# Patient Record
Sex: MALE | Race: BLACK OR AFRICAN AMERICAN | NOT HISPANIC OR LATINO | Employment: OTHER | URBAN - METROPOLITAN AREA
[De-identification: names, ages, dates, MRNs, and addresses within clinical notes are randomized per-mention and may not be internally consistent; named-entity substitution may affect disease eponyms.]

---

## 2017-12-08 RX ORDER — LISINOPRIL 20 MG/1
TABLET ORAL
Qty: 30 TABLET | Refills: 5 | OUTPATIENT
Start: 2017-12-08

## 2019-05-07 ENCOUNTER — OFFICE VISIT (OUTPATIENT)
Dept: NEUROLOGY | Facility: CLINIC | Age: 54
End: 2019-05-07
Payer: MEDICARE

## 2019-05-07 VITALS — HEART RATE: 83 BPM | SYSTOLIC BLOOD PRESSURE: 162 MMHG | DIASTOLIC BLOOD PRESSURE: 81 MMHG

## 2019-05-07 DIAGNOSIS — G47.33 OBSTRUCTIVE SLEEP APNEA: ICD-10-CM

## 2019-05-07 DIAGNOSIS — Z86.73 HISTORY OF EMBOLIC STROKE: ICD-10-CM

## 2019-05-07 DIAGNOSIS — G45.9 TRANSIENT CEREBRAL ISCHEMIA, UNSPECIFIED TYPE: ICD-10-CM

## 2019-05-07 DIAGNOSIS — R51.9 NEW ONSET OF HEADACHES: ICD-10-CM

## 2019-05-07 DIAGNOSIS — I10 ESSENTIAL HYPERTENSION: ICD-10-CM

## 2019-05-07 DIAGNOSIS — E13.49 OTHER SPECIFIED DIABETES MELLITUS WITH OTHER DIABETIC NEUROLOGICAL COMPLICATION: ICD-10-CM

## 2019-05-07 DIAGNOSIS — F17.200 SMOKER: ICD-10-CM

## 2019-05-07 DIAGNOSIS — I63.412 EMBOLIC STROKE INVOLVING LEFT MIDDLE CEREBRAL ARTERY: Primary | ICD-10-CM

## 2019-05-07 DIAGNOSIS — E11.49 TYPE 2 DIABETES MELLITUS WITH OTHER NEUROLOGIC COMPLICATION, WITHOUT LONG-TERM CURRENT USE OF INSULIN: ICD-10-CM

## 2019-05-07 PROCEDURE — 99214 PR OFFICE/OUTPT VISIT, EST, LEVL IV, 30-39 MIN: ICD-10-PCS | Mod: S$PBB,,, | Performed by: PSYCHIATRY & NEUROLOGY

## 2019-05-07 PROCEDURE — 99999 PR PBB SHADOW E&M-EST. PATIENT-LVL III: ICD-10-PCS | Mod: PBBFAC,,, | Performed by: PSYCHIATRY & NEUROLOGY

## 2019-05-07 PROCEDURE — 99213 OFFICE O/P EST LOW 20 MIN: CPT | Mod: PBBFAC | Performed by: PSYCHIATRY & NEUROLOGY

## 2019-05-07 PROCEDURE — 99999 PR PBB SHADOW E&M-EST. PATIENT-LVL III: CPT | Mod: PBBFAC,,, | Performed by: PSYCHIATRY & NEUROLOGY

## 2019-05-07 PROCEDURE — 99214 OFFICE O/P EST MOD 30 MIN: CPT | Mod: S$PBB,,, | Performed by: PSYCHIATRY & NEUROLOGY

## 2019-05-07 RX ORDER — AMLODIPINE BESYLATE 5 MG/1
TABLET ORAL
Refills: 6 | COMMUNITY
Start: 2019-04-10

## 2019-05-07 RX ORDER — LOSARTAN POTASSIUM AND HYDROCHLOROTHIAZIDE 12.5; 1 MG/1; MG/1
TABLET ORAL
Refills: 6 | COMMUNITY
Start: 2019-04-05 | End: 2023-08-07 | Stop reason: CLARIF

## 2019-05-07 RX ORDER — ATORVASTATIN CALCIUM 20 MG/1
TABLET, FILM COATED ORAL
Refills: 5 | COMMUNITY
Start: 2019-04-05

## 2019-05-07 RX ORDER — TELMISARTAN 80 MG/1
TABLET ORAL
Refills: 6 | COMMUNITY
Start: 2019-05-01 | End: 2023-08-07 | Stop reason: CLARIF

## 2019-05-07 RX ORDER — BUTALBITAL, ACETAMINOPHEN AND CAFFEINE 300; 40; 50 MG/1; MG/1; MG/1
CAPSULE ORAL
Refills: 0 | COMMUNITY
Start: 2019-05-01 | End: 2019-05-07

## 2019-05-07 RX ORDER — LINAGLIPTIN 5 MG/1
TABLET, FILM COATED ORAL
Refills: 6 | COMMUNITY
Start: 2019-04-23 | End: 2023-08-07 | Stop reason: CLARIF

## 2019-05-07 RX ORDER — METFORMIN HYDROCHLORIDE 750 MG/1
TABLET, EXTENDED RELEASE ORAL
Refills: 6 | COMMUNITY
Start: 2019-04-05 | End: 2023-08-07 | Stop reason: CLARIF

## 2019-05-07 RX ORDER — METOCLOPRAMIDE 10 MG/1
10 TABLET ORAL EVERY 6 HOURS PRN
Qty: 28 TABLET | Refills: 0 | Status: SHIPPED | OUTPATIENT
Start: 2019-05-07 | End: 2023-08-07 | Stop reason: CLARIF

## 2019-05-07 RX ORDER — METOPROLOL SUCCINATE 100 MG/1
TABLET, EXTENDED RELEASE ORAL
Refills: 6 | COMMUNITY
Start: 2019-04-05 | End: 2023-08-07 | Stop reason: CLARIF

## 2019-05-07 RX ORDER — LANOLIN ALCOHOL/MO/W.PET/CERES
400 CREAM (GRAM) TOPICAL DAILY
Qty: 30 TABLET | Refills: 3 | COMMUNITY
Start: 2019-05-07 | End: 2023-08-07 | Stop reason: CLARIF

## 2019-05-07 NOTE — PROGRESS NOTES
Vascular Neurology  Clinic Note    Reason For Visit (Chief Complaint): stroke, headaches    HPI: Mr. Kirk is a 54 y.o. man with PMH L-MCA stroke (2016), T2DM, HTN who presents for follow-up and new headaches.    Briefly, Mr. Kirk presented in 2016 with R sided weakness and aphasia.  He received tPA and then underwent thrombectomy of M3 clot.  He was discharged on DAPT for ESUS stroke.  Since 2016 it appears he never received 30d event monitor.    Mr. Kirk recovered with only mild dysarthria and some speech hesitancies, and occasional R leg pain while walking.  Over the last month though he's developed new headaches.  They usually start first thing in the morning.  They are frontal, b/l, throbbing in character.  He denies photo/phonophoia.  He has been taking fioricet and aspirin, with little relief.  He drinks 2 cups coffee/day, which has not changed recently.  He reports drinking plenty of water throughout the day.  No recent stressful events.  He is not sure if he snores. He eats a healthy diet, with fruits and vegetables, but sometimes eats fried chicken.     Brain Imaging:  MRI Brain 10/4/16:  Moderate-sized signal abnormality left MCA distribution primarily cortical in the left frontal lobe and insula corresponds to abnormality seen on CT compatible with recent infarct.      Cardiac Evaluation:  Results for orders placed or performed during the hospital encounter of 10/04/16   Echo doppler color flow   Result Value Ref Range    QEF 65 55 - 65    Diastolic Dysfunction No    LA size normal    Relevant Labwork:  Recent Labs   Lab 10/04/16  1802   HEMOGLOBIN A1C 8.9 H   LDL CHOLESTEROL 125.8   HDL 39 L   TRIGLYCERIDES 196 H   CHOLESTEROL 204 H       I independently viewed the above imaging studies and  I reviewed the reports of the above imaging.  I reviewed the above labwork.    Review of Systems  Msk: negative for muscle pain  Skin: negative for pruritis  Neuro: + headache  All others negative    Past  Medical History  Past Medical History:   Diagnosis Date    Cytotoxic cerebral edema 10/5/2016    Embolic stroke involving left middle cerebral artery 10/4/2016    Smoker 10/4/2016     Family History  Grandfather with stroke.    Social History  Retired .  Smokes occasionally.  Rare EtOH.    Medication List with Changes/Refills   New Medications    MAGNESIUM OXIDE (MAG-OX) 400 MG (241.3 MG MAGNESIUM) TABLET    Take 1 tablet (400 mg total) by mouth once daily.    METOCLOPRAMIDE HCL (REGLAN) 10 MG TABLET    Take 1 tablet (10 mg total) by mouth every 6 (six) hours as needed.   Current Medications    AMLODIPINE (NORVASC) 10 MG TABLET    Take 1 tablet (10 mg total) by mouth once daily.    AMLODIPINE (NORVASC) 5 MG TABLET        ASPIRIN 325 MG TABLET    Take 1 tablet (325 mg total) by mouth once daily.    ATORVASTATIN (LIPITOR) 20 MG TABLET        ATORVASTATIN (LIPITOR) 40 MG TABLET    Take 1 tablet (40 mg total) by mouth once daily.    CLOPIDOGREL (PLAVIX) 75 MG TABLET    Take 1 tablet (75 mg total) by mouth once daily.    HYDROCHLOROTHIAZIDE (HYDRODIURIL) 25 MG TABLET    Take 1 tablet (25 mg total) by mouth once daily.    LABETALOL (NORMODYNE) 200 MG TABLET    Take 1 tablet (200 mg total) by mouth every 8 (eight) hours.    LISINOPRIL (PRINIVIL,ZESTRIL) 20 MG TABLET    Take 1 tablet (20 mg total) by mouth once daily.    LOSARTAN-HYDROCHLOROTHIAZIDE 100-12.5 MG (HYZAAR) 100-12.5 MG TAB        METFORMIN (GLUCOPHAGE-XR) 750 MG 24 HR TABLET        METOPROLOL SUCCINATE (TOPROL-XL) 100 MG 24 HR TABLET        TELMISARTAN (MICARDIS) 80 MG TAB        TRADJENTA 5 MG TAB TABLET       Discontinued Medications    BUTALBITAL-ACETAMINOPHEN-CAFF -40 MG CAP           EXAM  Vital Signs  Pulse: 83  BP: (!) 162/81]    General: well appearing without discomfort   Neck: no carotid bruits  CV: RRR, nL S1&S2  Resp: breathing comfortably, no wheezing  Ext: wwp, no pedal edema    Mental Status: Alert and oriented, normal  attention, speech fluent and prosodic (rare hesitancy), naming and repetition intact, follows multistep embedded commands, no e/o neglect or extinction  Cranial Nerves: PERRL, EOMI, VFF, sensation intact, face symmetric, mild dysarthria, SCM/trap 5/5  Motor: Normal bulk and tone, no drift, finger taps symmetric  Strength 5/5 throughout  Sensory: intact light touch bilaterally  Coordination: no ataxia on finger-to-nose  Gait & Stance: normal  DTR: 2+ symmetric    NIHSS - 1    ___________________  ASSESSMENT & PLAN  Mr. Kirk is a 54 y.o. man with PMH L-MCA stroke (2016), T2DM, HTN who presents for follow-up and new headaches.  Stroke workup still incomplete, as he never received 30d event monitor.  Concern that headaches may represent untreated JOYCE, vs medication overuse headache.  No red-flag symptoms or new neurological deficits.  Patient is also in need of a PMD, as his diabetes has not been routinely monitored in the last year or so.    - Referral placed for PCP.  - Sleep study.  - STOP fioricet.  Use reglan PRN for headache.  - Start magnesium 400 mg daily for headache prophylaxis.  - Will check labs today: lipid panel, A1c.  - Will reorder cardiac event monitor.  - Continue aspirin/statin for secondary stroke prevention.  - Monitor BP.  Goal <140/80.  - Encouraged patient to quit smoking.  - RTC 2 mos.    Problem List Items Addressed This Visit        Unprioritized    Embolic stroke involving left middle cerebral artery - Primary    Relevant Orders    Ambulatory Referral to Internal Medicine    Hemoglobin A1c    Lipid panel    Cardiac event monitor    Polysomnogram (CPAP will be added if patient meets diagnostic criteria.)    Smoker    History of embolic stroke    Type 2 diabetes mellitus with neurologic complication, without long-term current use of insulin    Relevant Medications    TRADJENTA 5 mg Tab tablet    metFORMIN (GLUCOPHAGE-XR) 750 MG 24 hr tablet    New onset of headaches      Other Visit  Diagnoses     Other specified diabetes mellitus with other diabetic neurological complication         Relevant Medications    TRADJENTA 5 mg Tab tablet    metFORMIN (GLUCOPHAGE-XR) 750 MG 24 hr tablet    Other Relevant Orders    Hemoglobin A1c    Transient cerebral ischemia, unspecified type         Relevant Orders    Cardiac event monitor    Obstructive sleep apnea         Relevant Orders    Polysomnogram (CPAP will be added if patient meets diagnostic criteria.)          Bonita Rouse MD  Vascular Neurology

## 2019-05-07 NOTE — PATIENT INSTRUCTIONS
- Referral placed for primary care doctor.  Call  [phone] to schedule.  - Sleep study.    - STOP taking fioricet (acetaminophen/butalbital/caffeine).  Take metoclopramide (reglan) 1 tablet as needed for headache.  -  Start taking magnesium 1 tablet every day.  - Have bloodwork drawn today.  - Continue aspirin and atorvastatin for stroke prevention.  - Wear heart monitor for 1 month.  - Return to clinic in 2 months.

## 2019-05-14 ENCOUNTER — TELEPHONE (OUTPATIENT)
Dept: SLEEP MEDICINE | Facility: OTHER | Age: 54
End: 2019-05-14

## 2019-05-15 PROBLEM — E11.49 TYPE 2 DIABETES MELLITUS WITH NEUROLOGIC COMPLICATION, WITHOUT LONG-TERM CURRENT USE OF INSULIN: Status: ACTIVE | Noted: 2019-05-15

## 2019-05-15 PROBLEM — R51.9 NEW ONSET OF HEADACHES: Status: ACTIVE | Noted: 2019-05-15

## 2019-05-15 PROBLEM — I10 ESSENTIAL HYPERTENSION: Status: ACTIVE | Noted: 2019-05-15

## 2019-05-15 PROBLEM — Z86.73 HISTORY OF EMBOLIC STROKE: Status: ACTIVE | Noted: 2019-05-15

## 2019-05-30 ENCOUNTER — TELEPHONE (OUTPATIENT)
Dept: SLEEP MEDICINE | Facility: OTHER | Age: 54
End: 2019-05-30

## 2019-05-31 ENCOUNTER — HOSPITAL ENCOUNTER (OUTPATIENT)
Dept: SLEEP MEDICINE | Facility: OTHER | Age: 54
Discharge: HOME | End: 2019-05-31
Attending: PSYCHIATRY & NEUROLOGY
Payer: MEDICARE

## 2019-05-31 DIAGNOSIS — G47.33 OBSTRUCTIVE SLEEP APNEA: ICD-10-CM

## 2019-05-31 DIAGNOSIS — I63.412 EMBOLIC STROKE INVOLVING LEFT MIDDLE CEREBRAL ARTERY: ICD-10-CM

## 2019-05-31 PROCEDURE — 95810 POLYSOM 6/> YRS 4/> PARAM: CPT

## 2019-05-31 PROCEDURE — 95810 POLYSOM 6/> YRS 4/> PARAM: CPT | Mod: 26,,, | Performed by: PSYCHIATRY & NEUROLOGY

## 2019-05-31 PROCEDURE — 95810 PR POLYSOMNOGRAPHY, 4 OR MORE: ICD-10-PCS | Mod: 26,,, | Performed by: PSYCHIATRY & NEUROLOGY

## 2019-06-01 NOTE — PROGRESS NOTES
End of the night summary    Type of study performed on (SHAKIR) SCREEN      Patient education/cpap information prior to stuyd/setup    EKG Appears to be- NSR    Low spo2 - 79%    Any difficulties recording: Tir to secure o2 eeg lead      Tech summary Comments:    pt did not meet criteria for split on cpap, soft snoring observed, most of pts events observed supine position and in REM sleep, pt observed tossing and turning most of the night and prefers to sleep on his Right side, pt slept well no reports of discomfort

## 2019-06-06 NOTE — PROCEDURES
"Dear Referring Provider,    The sleep study that you ordered is complete. You have ordered sleep LAB services to perform the sleep study for Derek Kirk Jr..     Please find Sleep Study result in "Chart Review" under the "Media tab."     As the ordering provider, you are responsible for reviewing the results and implementing a treatment plan with your patient. If you need a Sleep Medicine provider to explain the sleep study findings and arrange treatment for the patient, please refer patient for consultation to our Sleep Clinic via [facility] with Ambulatory Consult Sleep.    To do that please place an order for an "Ambulatory Consult Sleep" - it will go to our clinic work queue for our Medical Assistant to contact the patient for an appointment.     For any questions, please contact our clinic staff at [phone] to talk to clinical staff.      "

## 2019-06-20 DIAGNOSIS — G47.33 OBSTRUCTIVE SLEEP APNEA: Primary | ICD-10-CM

## 2019-06-20 NOTE — PROGRESS NOTES
Informed patient result of sleep study - moderate JOYCE.  Will refer to ambulatory sleep clinic for further treatment.      Bonita Rouse MD  Vascular Neurology

## 2019-07-30 ENCOUNTER — OFFICE VISIT (OUTPATIENT)
Dept: NEUROLOGY | Facility: CLINIC | Age: 54
End: 2019-07-30
Payer: MEDICARE

## 2019-07-30 VITALS
SYSTOLIC BLOOD PRESSURE: 160 MMHG | DIASTOLIC BLOOD PRESSURE: 99 MMHG | HEART RATE: 72 BPM | WEIGHT: 275 LBS | HEIGHT: 69 IN | BODY MASS INDEX: 40.73 KG/M2

## 2019-07-30 DIAGNOSIS — Z86.73 HISTORY OF EMBOLIC STROKE: ICD-10-CM

## 2019-07-30 DIAGNOSIS — G47.33 OBSTRUCTIVE SLEEP APNEA: ICD-10-CM

## 2019-07-30 DIAGNOSIS — F17.200 SMOKER: ICD-10-CM

## 2019-07-30 DIAGNOSIS — E11.49 TYPE 2 DIABETES MELLITUS WITH OTHER NEUROLOGIC COMPLICATION, WITHOUT LONG-TERM CURRENT USE OF INSULIN: ICD-10-CM

## 2019-07-30 DIAGNOSIS — I63.412 EMBOLIC STROKE INVOLVING LEFT MIDDLE CEREBRAL ARTERY: Primary | ICD-10-CM

## 2019-07-30 PROCEDURE — 99214 OFFICE O/P EST MOD 30 MIN: CPT | Mod: PBBFAC | Performed by: PSYCHIATRY & NEUROLOGY

## 2019-07-30 PROCEDURE — 99999 PR PBB SHADOW E&M-EST. PATIENT-LVL IV: CPT | Mod: PBBFAC,,, | Performed by: PSYCHIATRY & NEUROLOGY

## 2019-07-30 PROCEDURE — 99215 OFFICE O/P EST HI 40 MIN: CPT | Mod: S$PBB,,, | Performed by: PSYCHIATRY & NEUROLOGY

## 2019-07-30 PROCEDURE — 99999 PR PBB SHADOW E&M-EST. PATIENT-LVL IV: ICD-10-PCS | Mod: PBBFAC,,, | Performed by: PSYCHIATRY & NEUROLOGY

## 2019-07-30 PROCEDURE — 99215 PR OFFICE/OUTPT VISIT, EST, LEVL V, 40-54 MIN: ICD-10-PCS | Mod: S$PBB,,, | Performed by: PSYCHIATRY & NEUROLOGY

## 2019-07-30 RX ORDER — ATORVASTATIN CALCIUM 20 MG/1
20 TABLET, FILM COATED ORAL DAILY
Qty: 90 TABLET | Refills: 3 | Status: SHIPPED | OUTPATIENT
Start: 2019-07-30 | End: 2020-07-29

## 2019-07-30 NOTE — PROGRESS NOTES
Vascular Neurology  Clinic Note    Reason For Visit (Chief Complaint): stroke    HPI: Mr. Kirk is a 54 y.o. man with PMH L-MCA stroke (2016), T2DM, HTN who presents for results of sleep study and med refills.     Briefly, Mr. Kirk presented in 2016 with R sided weakness and aphasia.  He received tPA and then underwent thrombectomy of M3 clot.  He was discharged on DAPT for ESUS stroke.  Since 2016 it appears he never received 30d event monitor.     Mr. Kirk recovered with only mild dysarthria and some speech hesitancies, and occasional R leg pain while walking.  Over the last month though he's developed new headaches.  They usually start first thing in the morning.  They are frontal, b/l, throbbing in character.  He denies photo/phonophoia.  He has been taking fioricet and aspirin, with little relief.  He drinks 2 cups coffee/day, which has not changed recently.  He reports drinking plenty of water throughout the day.  No recent stressful events.  He is not sure if he snores. He eats a healthy diet, with fruits and vegetables, but sometimes eats fried chicken.      Interval History:  Stopped therapy in February, but still feels like R leg sometimes is dragging, and causing some pain.  Also has run out of medications for T2DM, HTN.  Has not yet seen PCP and was not scheduled with [facility] one (referral placed at last visit).  He agrees to return to Dr. Boles until he finds a new one.  Has been avoiding fried food, and has been eating fruits and veggies.  Has cut down on smoking.  Smokes about 2 cigarettes/day.  Received dx of JOYCE from sleep study, but has not yet followed up with pulmonary regarding treatment.  Did not receive 30d event monitor.    Brain Imaging:  MRI Brain 10/4/16:  Moderate-sized signal abnormality left MCA distribution primarily cortical in the left frontal lobe and insula corresponds to abnormality seen on CT compatible with recent infarct.    Cardiac Evaluation:  Results for orders  placed or performed during the hospital encounter of 10/04/16   Echo doppler color flow   Result Value Ref Range    QEF 65 55 - 65    Diastolic Dysfunction No    LA size normal     Relevant Labwork:  Recent Labs   Lab 10/04/16  1802   Hemoglobin A1C 8.9 H   LDL Cholesterol 125.8   HDL 39 L   Triglycerides 196 H   Cholesterol 204 H       I independently viewed the above imaging studies and  I reviewed the reports of the above imaging.  I reviewed the above labwork.    Review of Systems  Msk: negative for muscle pain  Skin: negative for pruritis  Neuro: + headache  All others negative    Past Medical History  Past Medical History:   Diagnosis Date    Cytotoxic cerebral edema 10/5/2016    Embolic stroke involving left middle cerebral artery 10/4/2016    Smoker 10/4/2016     Family History  Grandfather with stroke.    Social History  Retired .  Smokes occasionally.  Rare EtOH.    Medication List with Changes/Refills   New Medications    ATORVASTATIN (LIPITOR) 20 MG TABLET    Take 1 tablet (20 mg total) by mouth once daily.   Current Medications    AMLODIPINE (NORVASC) 5 MG TABLET        ASPIRIN 325 MG TABLET    Take 1 tablet (325 mg total) by mouth once daily.    ATORVASTATIN (LIPITOR) 20 MG TABLET        CLOPIDOGREL (PLAVIX) 75 MG TABLET    Take 1 tablet (75 mg total) by mouth once daily.    HYDROCHLOROTHIAZIDE (HYDRODIURIL) 25 MG TABLET    Take 1 tablet (25 mg total) by mouth once daily.    LABETALOL (NORMODYNE) 200 MG TABLET    Take 1 tablet (200 mg total) by mouth every 8 (eight) hours.    LISINOPRIL (PRINIVIL,ZESTRIL) 20 MG TABLET    Take 1 tablet (20 mg total) by mouth once daily.    LOSARTAN-HYDROCHLOROTHIAZIDE 100-12.5 MG (HYZAAR) 100-12.5 MG TAB        MAGNESIUM OXIDE (MAG-OX) 400 MG (241.3 MG MAGNESIUM) TABLET    Take 1 tablet (400 mg total) by mouth once daily.    METFORMIN (GLUCOPHAGE-XR) 750 MG 24 HR TABLET        METOCLOPRAMIDE HCL (REGLAN) 10 MG TABLET    Take 1 tablet (10 mg total) by  "mouth every 6 (six) hours as needed.    METOPROLOL SUCCINATE (TOPROL-XL) 100 MG 24 HR TABLET        TELMISARTAN (MICARDIS) 80 MG TAB        TRADJENTA 5 MG TAB TABLET           EXAM  Vital Signs:Blood pressure (!) 160/99, pulse 72, height 5' 9" (1.753 m), weight 124.7 kg (275 lb).  General: well appearing without discomfort   Neck: no carotid bruits  CV: RRR, nL S1&S2  Resp: breathing comfortably, no wheezing  Ext: wwp, no pedal edema    Mental Status: Alert and oriented, normal attention, speech fluent and prosodic (occasional hesitancy /stutter), naming and repetition intact, follows multistep embedded commands, no e/o neglect or extinction  Cranial Nerves: PERRL, EOMI, VFF, sensation intact, face symmetric, TUP midline, mild dysarthria, SCM/trap 5/5  Motor: Normal bulk and tone, no drift, finger taps symmetric  Strength 5/5 throughout  Sensory: intact light touch bilaterally  Coordination: no ataxia on finger-to-nose  Gait & Stance: normal  DTR: 2+ symmetric    NIHSS - 1    ___________________  ASSESSMENT & PLAN  Mr. Kirk is a 54 y.o. man with PMH L-MCA stroke (2016), T2DM, HTN who presents for follow-up and new headaches.  Stroke workup still incomplete, as he never received 30d event monitor.  Sleep study confirms JOYCE, needs appt with Pulmonary to receive CPAP.  Also patient still does not have PCP he sees regularly; re-referred to [facility].    - Referral to Pulmonary for sleep apnea.  - Continue aspirin/statin for stroke prevention.  - Re-ordered cardiac monitor.  - Referral to [facility].  Patient advised to see Dr. Boles in the mean time for medication refills.  - Encouraged patient to quit smoking completely.  - Re-ordered PT/SLP.  - Monitor T2DM and HTN --> care per PCP.  - Mediterranean Diet for stroke prevention.  - RTC annually.    Problem List Items Addressed This Visit        Unprioritized    Embolic stroke involving left middle cerebral artery - Primary    Relevant Orders    " Ambulatory Referral to Physical/Occupational Therapy    Ambulatory Referral to Family Practice    Ambulatory Referral to Speech Therapy    Smoker    History of embolic stroke    Type 2 diabetes mellitus with neurologic complication, without long-term current use of insulin    Obstructive sleep apnea          Bonita Rouse MD  Vascular Neurology

## 2019-07-30 NOTE — PATIENT INSTRUCTIONS
- See pulmonologist for sleep apnea.  - Continue aspirin and atorvastatin for stroke prevention.  - Followup with Dr. Boles (or [facility]).  - Resume speech and physical therapy at [facility].  - Work on diabetes and blood pressure control.    Mediterranean Diet Recommendations    · Eat primarily plant-based foods, such as fruits and vegetables, whole grains, legumes (beans) and nuts.  · Limit refined carbohydrates (white pasta, bread, rice).  · Replace butter with healthy fats such as olive oil.  · Use herbs and spices instead of salt to flavor foods.  · Limit red meat and processed meats to no more than a few times a month.  · Avoid sugary sodas, bakery goods, and sweets.  · Eat fish and poultry at least twice a week.  · Get plenty of exercise (150 minutes per week).    Adopted from Sam richardson al, [employer], 2018.

## 2019-08-27 ENCOUNTER — TELEPHONE (OUTPATIENT)
Dept: NEUROLOGY | Facility: CLINIC | Age: 54
End: 2019-08-27

## 2019-08-27 NOTE — TELEPHONE ENCOUNTER
----- Message from Madonna Wilson sent at 8/27/2019  2:40 PM CDT -----  Contact: pt   Patient Requesting Sooner Appointment.     Reason for sooner appt.: pt is calling to schedule an appt for a fu   When is the first available appointment? N/A   Communication Preference: can you please call pt at [phone]  Additional Information: pt was seen at a doctor in [place] pt said he waited for hours and didn't get seen     I don't have access to the schedule no appts were coming up to schedule     BRITTANY

## 2019-08-27 NOTE — TELEPHONE ENCOUNTER
Scheduled patient with internal medicine doctor, patient had some issues with the family doctor in [place].

## 2019-08-29 ENCOUNTER — PATIENT MESSAGE (OUTPATIENT)
Dept: ADMINISTRATIVE | Facility: HOSPITAL | Age: 54
End: 2019-08-29

## 2019-08-29 ENCOUNTER — PATIENT OUTREACH (OUTPATIENT)
Dept: ADMINISTRATIVE | Facility: HOSPITAL | Age: 54
End: 2019-08-29

## 2019-08-29 NOTE — PROGRESS NOTES
Portal message sent of need to obtain any outside records, ex : Diabetic Eye Exam so HM can be updated.

## 2020-04-25 ENCOUNTER — HISTORICAL (OUTPATIENT)
Dept: ADMINISTRATIVE | Facility: HOSPITAL | Age: 55
End: 2020-04-25

## 2020-04-25 LAB
ALBUMIN SERPL BCP-MCNC: 3.7 G/DL (ref 3.5–5)
ALBUMIN/GLOB SERPL ELPH: 1 {RATIO} (ref 1.5–2.2)
ALP SERPL-CCNC: 84 U/L (ref 50–136)
ALT SERPL W P-5'-P-CCNC: 17 U/L (ref 16–61)
ANION GAP SERPL CALC-SCNC: 14.9 MEQ/L (ref 10–20)
AST SERPL-CCNC: 10 U/L (ref 15–37)
BANDS: 2 % (ref 0–3)
BASOPHILS NFR BLD: 0 % (ref 0–3)
BILIRUB SERPL-MCNC: 0.21 MG/DL (ref 0.2–1)
BUN SERPL-MCNC: 18 MG/DL (ref 7–18)
CALCIUM SERPL-MCNC: 9.4 MG/DL (ref 8.5–10.1)
CHLORIDE SERPL-SCNC: 103 MMOL/L (ref 98–107)
CO2 SERPL-SCNC: 26 MMOL/L (ref 22–32)
CREAT SERPL-MCNC: 0.94 MG/DL (ref 0.7–1.3)
EGFR: 108 ML/MIN/1.73M
EOSINOPHIL NFR BLD: 9 % (ref 0–5)
ERYTHROCYTE [DISTWIDTH] IN BLOOD BY AUTOMATED COUNT: 13.6 % (ref 11.5–14.5)
GLOBULIN: 3.6 G/DL (ref 2.3–3.5)
GLUCOSE SERPL-MCNC: 219 MG/DL (ref 70–99)
GRAN%: 47 % (ref 46–66)
HCT VFR BLD AUTO: 43.1 % (ref 43.5–53.7)
HGB BLD-MCNC: 13.9 G/DL (ref 14.1–18.1)
LYMPH #: 38 % (ref 24–44)
MANUAL ABSOLUTE NEUTROPHIL: 2.5 10 (ref 2–7.5)
MCH RBC QN AUTO: 29 PG (ref 27–31)
MCHC RBC AUTO-ENTMCNC: 32.3 G% (ref 32–35)
MCV RBC AUTO: 90 FL (ref 80–97)
MONO #: 4 % (ref 0–12)
NUCLEATED RBC/100 LEUKOCYTES: 0
OSMOC: 288 MOSM/KG (ref 275–295)
PMV BLD AUTO: 218 10 (ref 142–424)
PMV BLD AUTO: 9.8 FL (ref 7.4–10.4)
POTASSIUM SERPL-SCNC: 3.9 MMOL/L (ref 3.5–5.1)
PROT SERPL-MCNC: 7.3 G/DL (ref 6.4–8.2)
RBC # BLD AUTO: 4.79 M/UL (ref 4.69–6.13)
SODIUM BLD-SCNC: 140 MMOL/L (ref 136–145)
TOTALC1: 100
TROPONIN I SERPL DL<=0.01 NG/ML-MCNC: <0.02 NG/ML (ref 0–0.05)
WBC # BLD AUTO: 5 10 (ref 4–10.2)

## 2020-05-14 LAB — SARS-COV-2 RNA RESP QL NAA+PROBE: NOT DETECTED

## 2021-05-06 ENCOUNTER — PATIENT MESSAGE (OUTPATIENT)
Dept: RESEARCH | Facility: HOSPITAL | Age: 56
End: 2021-05-06

## 2021-08-23 PROCEDURE — 99284 EMERGENCY DEPT VISIT MOD MDM: CPT | Mod: 25

## 2021-08-23 PROCEDURE — 10060 I&D ABSCESS SIMPLE/SINGLE: CPT

## 2021-08-24 ENCOUNTER — HOSPITAL ENCOUNTER (EMERGENCY)
Facility: HOSPITAL | Age: 56
Discharge: HOME | End: 2021-08-24
Attending: EMERGENCY MEDICINE
Payer: MEDICARE

## 2021-08-24 VITALS
BODY MASS INDEX: 33.8 KG/M2 | WEIGHT: 255 LBS | RESPIRATION RATE: 16 BRPM | HEIGHT: 73 IN | OXYGEN SATURATION: 99 % | TEMPERATURE: 98 F | SYSTOLIC BLOOD PRESSURE: 183 MMHG | DIASTOLIC BLOOD PRESSURE: 88 MMHG | HEART RATE: 89 BPM

## 2021-08-24 DIAGNOSIS — L02.214 ABSCESS OF GROIN, RIGHT: Primary | ICD-10-CM

## 2021-08-24 DIAGNOSIS — B36.9 FUNGAL SKIN INFECTION: ICD-10-CM

## 2021-08-24 PROCEDURE — 10060 I&D ABSCESS SIMPLE/SINGLE: CPT

## 2021-08-24 PROCEDURE — 25000003 PHARM REV CODE 250: Performed by: EMERGENCY MEDICINE

## 2021-08-24 RX ORDER — CLOTRIMAZOLE 1 %
CREAM (GRAM) TOPICAL
Qty: 15 G | Refills: 0 | Status: SHIPPED | OUTPATIENT
Start: 2021-08-24

## 2021-08-24 RX ORDER — LIDOCAINE HYDROCHLORIDE 10 MG/ML
5 INJECTION, SOLUTION EPIDURAL; INFILTRATION; INTRACAUDAL; PERINEURAL
Status: COMPLETED | OUTPATIENT
Start: 2021-08-24 | End: 2021-08-24

## 2021-08-24 RX ORDER — SULFAMETHOXAZOLE AND TRIMETHOPRIM 800; 160 MG/1; MG/1
1 TABLET ORAL 2 TIMES DAILY
Qty: 20 TABLET | Refills: 0 | Status: SHIPPED | OUTPATIENT
Start: 2021-08-24 | End: 2021-09-03

## 2021-08-24 RX ADMIN — LIDOCAINE HYDROCHLORIDE 50 MG: 10 INJECTION, SOLUTION EPIDURAL; INFILTRATION; INTRACAUDAL; PERINEURAL at 01:08

## 2022-10-22 ENCOUNTER — HOSPITAL ENCOUNTER (EMERGENCY)
Facility: HOSPITAL | Age: 57
Discharge: HOME | End: 2022-10-22
Attending: EMERGENCY MEDICINE
Payer: MEDICARE

## 2022-10-22 VITALS
HEIGHT: 73 IN | SYSTOLIC BLOOD PRESSURE: 172 MMHG | RESPIRATION RATE: 18 BRPM | DIASTOLIC BLOOD PRESSURE: 73 MMHG | OXYGEN SATURATION: 95 % | TEMPERATURE: 99 F | BODY MASS INDEX: 33.64 KG/M2 | HEART RATE: 98 BPM

## 2022-10-22 DIAGNOSIS — I10 HYPERTENSION, UNSPECIFIED TYPE: Primary | ICD-10-CM

## 2022-10-22 DIAGNOSIS — J06.9 VIRAL URI WITH COUGH: ICD-10-CM

## 2022-10-22 LAB
CTP QC/QA: YES
CTP QC/QA: YES
POC MOLECULAR INFLUENZA A AGN: NEGATIVE
POC MOLECULAR INFLUENZA B AGN: NEGATIVE
SARS-COV-2 RDRP RESP QL NAA+PROBE: NEGATIVE

## 2022-10-22 PROCEDURE — 99284 EMERGENCY DEPT VISIT MOD MDM: CPT | Mod: 25

## 2022-10-22 PROCEDURE — 96374 THER/PROPH/DIAG INJ IV PUSH: CPT

## 2022-10-22 PROCEDURE — 96376 TX/PRO/DX INJ SAME DRUG ADON: CPT

## 2022-10-22 PROCEDURE — 87635 SARS-COV-2 COVID-19 AMP PRB: CPT | Performed by: EMERGENCY MEDICINE

## 2022-10-22 PROCEDURE — 25000003 PHARM REV CODE 250: Performed by: EMERGENCY MEDICINE

## 2022-10-22 RX ORDER — ROSUVASTATIN CALCIUM 40 MG/1
TABLET, COATED ORAL
COMMUNITY

## 2022-10-22 RX ORDER — PROPOFOL 10 MG/ML
INJECTION, EMULSION INTRAVENOUS
Status: DISCONTINUED
Start: 2022-10-22 | End: 2022-10-22 | Stop reason: WASHOUT

## 2022-10-22 RX ORDER — LABETALOL HCL 20 MG/4 ML
20 SYRINGE (ML) INTRAVENOUS
Status: COMPLETED | OUTPATIENT
Start: 2022-10-22 | End: 2022-10-22

## 2022-10-22 RX ORDER — DULAGLUTIDE 1.5 MG/.5ML
INJECTION, SOLUTION SUBCUTANEOUS
COMMUNITY

## 2022-10-22 RX ORDER — ONDANSETRON 4 MG/1
4-8 TABLET, ORALLY DISINTEGRATING ORAL EVERY 8 HOURS PRN
Qty: 20 TABLET | Refills: 0 | Status: SHIPPED | OUTPATIENT
Start: 2022-10-22 | End: 2023-08-07 | Stop reason: CLARIF

## 2022-10-22 RX ORDER — TADALAFIL 20 MG/1
10 TABLET ORAL DAILY
COMMUNITY
Start: 2022-06-08

## 2022-10-22 RX ORDER — HYDROCODONE POLISTIREX AND CHLORPHENIRAMINE POLISTIREX 10; 8 MG/5ML; MG/5ML
5 SUSPENSION, EXTENDED RELEASE ORAL EVERY 12 HOURS PRN
Qty: 70 ML | Refills: 0 | Status: SHIPPED | OUTPATIENT
Start: 2022-10-22 | End: 2023-08-07 | Stop reason: CLARIF

## 2022-10-22 RX ORDER — TICAGRELOR 90 MG/1
90 TABLET ORAL 2 TIMES DAILY
COMMUNITY
Start: 2022-07-06

## 2022-10-22 RX ORDER — EMPAGLIFLOZIN 10 MG/1
10 TABLET, FILM COATED ORAL DAILY
COMMUNITY
Start: 2022-06-08

## 2022-10-22 RX ORDER — ETOMIDATE 2 MG/ML
INJECTION INTRAVENOUS
Status: DISCONTINUED
Start: 2022-10-22 | End: 2022-10-22 | Stop reason: WASHOUT

## 2022-10-22 RX ADMIN — LABETALOL HYDROCHLORIDE 20 MG: 5 INJECTION, SOLUTION INTRAVENOUS at 09:10

## 2022-10-22 RX ADMIN — LABETALOL HYDROCHLORIDE 20 MG: 5 INJECTION, SOLUTION INTRAVENOUS at 10:10

## 2022-10-23 NOTE — ED PROVIDER NOTES
"Encounter Date: 10/22/2022       History     Chief Complaint   Patient presents with    Cough     Pt presents to the ER w/ complaints of cough and chills starting at 0600. Pt denies any known exposure to flu/covid. Pt reports taking nyquil and kim seltzer for his symptoms, denies any relief. Pt states "he thinks he has a fever," did not check temp at home.      57-year-old male comes in complaining of non-productive cough and chills that began around 6:00 a.m..  He does not know of being exposed to flu or COVID.  No chest pain or shortness of breath or abdominal pain or vomiting or diarrhea.  He says he felt warm at thinks he had a fever but he did not check a temperature.  He reports taking NyQuil and Kim-[place] without any relief.    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/2016    Diabetes mellitus     Embolic stroke involving left middle cerebral artery 10/4/2016    Heart attack     Hypertension     Smoker 10/4/2016     Past Surgical History:   Procedure Laterality Date    CARDIAC CATHETERIZATION      CORONARY ANGIOPLASTY WITH STENT PLACEMENT      FEMORAL ARTERY STENT       Family History   Problem Relation Age of Onset    Hypertension Mother     Hypertension Father      Social History     Tobacco Use    Smoking status: Light Smoker    Smokeless tobacco: Never   Substance Use Topics    Alcohol use: No     Review of Systems   Constitutional:  Positive for chills. Negative for fever.   HENT:  Negative for sore throat, trouble swallowing and voice change.    Eyes:  Negative for visual disturbance.   Respiratory:  Positive for cough. Negative for shortness of breath, wheezing and stridor.    Cardiovascular:  Negative for chest pain, palpitations and leg swelling.   Gastrointestinal:  Negative for abdominal pain, diarrhea, nausea and vomiting.   Genitourinary:  Negative for dysuria. "   Musculoskeletal:  Negative for back pain and gait problem.   Skin:  Negative for rash.   Neurological:  Negative for dizziness, seizures, syncope, facial asymmetry, speech difficulty, weakness, light-headedness, numbness and headaches.   Hematological:  Does not bruise/bleed easily.   Psychiatric/Behavioral:  Negative for confusion.    All other systems reviewed and are negative.    Physical Exam     Initial Vitals [10/22/22 2120]   BP Pulse Resp Temp SpO2   (!) 212/95 98 18 99 °F (37.2 °C) 95 %      MAP       --         Physical Exam    Nursing note and vitals reviewed.  Constitutional: He appears well-developed and well-nourished. He is not diaphoretic.   HENT:   Head: Normocephalic and atraumatic.   Eyes: EOM are normal. Pupils are equal, round, and reactive to light.   Neck: Neck supple.   Normal range of motion.  Cardiovascular:  Normal rate and regular rhythm.           Pulmonary/Chest: Breath sounds normal. No respiratory distress. He has no wheezes.   Abdominal: Abdomen is soft. Bowel sounds are normal. There is no abdominal tenderness.   Musculoskeletal:         General: Normal range of motion.      Cervical back: Normal range of motion and neck supple.     Neurological: He is alert and oriented to person, place, and time. He has normal strength. No cranial nerve deficit or sensory deficit.   Skin: Skin is warm and dry.   Psychiatric: He has a normal mood and affect. Thought content normal.       ED Course   Procedures  Labs Reviewed   POCT INFLUENZA A/B MOLECULAR   SARS-COV-2 RDRP GENE    Narrative:     ..This test utilizes isothermal nucleic acid amplification   technology to detect the SARS-CoV-2 RdRp nucleic acid segment.   The analytical sensitivity (limit of detection) is 125 genome   equivalents/mL.   A POSITIVE result implies infection with the SARS-CoV-2 virus;   the patient is presumed to be contagious.     A NEGATIVE result means that SARS-CoV-2 nucleic acids are not   present above the limit  of detection. A NEGATIVE result should be   treated as presumptive. It does not rule out the possibility of   COVID-19 and should not be the sole basis for treatment decisions.   If COVID-19 is strongly suspected based on clinical and exposure   history, re-testing using an alternate molecular assay should be   considered.   This test is only for use under the Food and Drug   Administration s Emergency Use Authorization (EUA).   Commercial kits are provided by [employer].   Performance characteristics of the EUA have been independently   verified by [facility] Department of   Pathology and Laboratory Medicine.   _________________________________________________________________   The authorized Fact Sheet for Healthcare Providers and the authorized Fact   Sheet for Patients of the ID NOW COVID-19 are available on the FDA   website:     https://www.fda.gov/media/794121/download  https://www.fda.gov/media/685189/download                 Imaging Results    None          Medications   labetalol 20 mg/4 mL (5 mg/mL) IV syring (20 mg Intravenous Given 10/22/22 2138)   labetalol 20 mg/4 mL (5 mg/mL) IV syring (20 mg Intravenous Given 10/22/22 2220)                Attending Attestation:             Attending ED Notes:   57-year-old male comes in complaining of non-productive cough and chills that began around 6:00 a.m..  He does not know of being exposed to flu or COVID.  No chest pain or shortness of breath or abdominal pain or vomiting or diarrhea.  He says he felt warm at thinks he had a fever but he did not check a temperature.  He reports taking NyQuil and Kim-[place] without any relief.    Patient hypertensive upon arrival, reports not taking his blood pressure medications, labetalol given with appropriate reduction in MAP.  Flu and COVID negative.  Presentation consistent with viral URI.  Patient given prescription for Tussionex and Zofran and instructed to take over-the-counter Mucinex DM,  Flonase, Zyrtec, Tylenol, ibuprofen. Patient is non-toxic appearing, in no acute distress, and vital signs are stable and normal upon discharge. Upon completion of ED evaluation and management, with consideration of thorough differential diagnosis, the patient was found to have no acutely abnormal physical exam findings or other pathology requiring further emergent intervention or admission at this time. Patient/caregiver has no complaints upon discharge and verbalizes understanding and agreement with diagnosis and treatment plan. Discharge instructions with return precautions provided. Patient/caregiver verbalizes understanding to return to ED immediately for any new or worsening symptoms and to follow up with PCP/specialist recommended in 1-2 days.                          Clinical Impression:   Final diagnoses:  [I10] Hypertension, unspecified type (Primary)  [J06.9] Viral URI with cough        ED Disposition Condition    Discharge Stable          ED Prescriptions       Medication Sig Dispense Start Date End Date Auth. Provider    hydrocodone-chlorpheniramine (TUSSIONEX) 10-8 mg/5 mL suspension Take 5 mLs by mouth every 12 (twelve) hours as needed for Cough or Congestion. 70 mL 10/22/2022 -- Diandra Israel MD    ondansetron (ZOFRAN-ODT) 4 MG TbDL Take 1-2 tablets (4-8 mg total) by mouth every 8 (eight) hours as needed (Nausea/Vomiting). 20 tablet 10/22/2022 -- Diandra Israel MD          Follow-up Information       Follow up With Specialties Details Why Contact Info Additional Information    Vic Boles MD Internal Medicine Schedule an appointment as soon as possible for a visit   [address] [facility] [address] [phone]       [place] - Emergency Department Emergency Medicine Go to  As needed, If symptoms worsen [facility] [facility] [place] [address] [phone] Floor 1             Diandra Israel MD  10/23/22 2796

## 2022-10-23 NOTE — DISCHARGE INSTRUCTIONS
Take over-the-counter Mucinex DM maximum Strength 1200 mg/60 mg for cough, Flonase and Zyrtec for nasal congestion, Tylenol 1000 mg every 6 hours and/or ibuprofen 800 mg every 8 hours for fever, body aches, chills.    Do a home test for COVID in a couple of days because it might be too early to detect the antigen; follow [place] department of Health and CDC guidelines for quarantine in the interim.    Read all discharge instructions/education provided: follow all recommendations and return precautions.  Take all medications as prescribed.  Follow up with your primary care doctor as directed.  Return to emergency department immediately for any new or worsening or recurrent symptoms.

## 2023-02-10 ENCOUNTER — HOSPITAL ENCOUNTER (EMERGENCY)
Facility: HOSPITAL | Age: 58
Discharge: HOME | End: 2023-02-10
Attending: EMERGENCY MEDICINE
Payer: MEDICARE

## 2023-02-10 VITALS
DIASTOLIC BLOOD PRESSURE: 92 MMHG | HEIGHT: 73 IN | RESPIRATION RATE: 18 BRPM | HEART RATE: 93 BPM | BODY MASS INDEX: 34.81 KG/M2 | TEMPERATURE: 98 F | WEIGHT: 262.63 LBS | SYSTOLIC BLOOD PRESSURE: 179 MMHG | OXYGEN SATURATION: 94 %

## 2023-02-10 DIAGNOSIS — J06.9 UPPER RESPIRATORY TRACT INFECTION, UNSPECIFIED TYPE: ICD-10-CM

## 2023-02-10 DIAGNOSIS — R05.9 COUGH, UNSPECIFIED TYPE: Primary | ICD-10-CM

## 2023-02-10 DIAGNOSIS — R79.89 ELEVATED BRAIN NATRIURETIC PEPTIDE (BNP) LEVEL: ICD-10-CM

## 2023-02-10 DIAGNOSIS — R06.02 SOB (SHORTNESS OF BREATH): ICD-10-CM

## 2023-02-10 DIAGNOSIS — J81.0 ACUTE PULMONARY EDEMA: ICD-10-CM

## 2023-02-10 DIAGNOSIS — R09.02 HYPOXIA: ICD-10-CM

## 2023-02-10 LAB
ALBUMIN SERPL BCP-MCNC: 3.2 G/DL (ref 3.5–5.2)
ALP SERPL-CCNC: 85 U/L (ref 55–135)
ALT SERPL W/O P-5'-P-CCNC: 60 U/L (ref 10–44)
ANION GAP SERPL CALC-SCNC: 7 MMOL/L (ref 8–16)
AST SERPL-CCNC: 65 U/L (ref 10–40)
BASOPHILS # BLD AUTO: 0.07 K/UL (ref 0–0.2)
BASOPHILS NFR BLD: 1.2 % (ref 0–1.9)
BILIRUB SERPL-MCNC: 0.3 MG/DL (ref 0.1–1)
BUN SERPL-MCNC: 16 MG/DL (ref 6–20)
CALCIUM SERPL-MCNC: 8.8 MG/DL (ref 8.7–10.5)
CHLORIDE SERPL-SCNC: 110 MMOL/L (ref 95–110)
CO2 SERPL-SCNC: 28 MMOL/L (ref 23–29)
COMMENTS: ABNORMAL
CORRECTED TEMPERATURE (PCO2): 49 MMHG (ref 35–45)
CORRECTED TEMPERATURE (PH): 7.34 (ref 7.35–7.45)
CORRECTED TEMPERATURE (PO2): 55.6 MMHG (ref 80–100)
CREAT SERPL-MCNC: 1.1 MG/DL (ref 0.5–1.4)
CTP QC/QA: YES
CTP QC/QA: YES
DIFFERENTIAL METHOD: ABNORMAL
EOSINOPHIL # BLD AUTO: 0.5 K/UL (ref 0–0.5)
EOSINOPHIL NFR BLD: 8.1 % (ref 0–8)
ERYTHROCYTE [DISTWIDTH] IN BLOOD BY AUTOMATED COUNT: 13.4 % (ref 11.5–14.5)
EST. GFR  (NO RACE VARIABLE): >60 ML/MIN/1.73 M^2
FIO2: 28 %
GLUCOSE SERPL-MCNC: 274 MG/DL (ref 70–110)
HCT VFR BLD AUTO: 41.3 % (ref 40–54)
HGB BLD-MCNC: 13.7 G/DL (ref 14–18)
IMM GRANULOCYTES # BLD AUTO: 0.02 K/UL (ref 0–0.04)
IMM GRANULOCYTES NFR BLD AUTO: 0.3 % (ref 0–0.5)
INR PPP: 1 (ref 0.8–1.2)
LACTATE SERPL-SCNC: 1.2 MMOL/L (ref 0.5–2.2)
LPM: 2
LYMPHOCYTES # BLD AUTO: 1.5 K/UL (ref 1–4.8)
LYMPHOCYTES NFR BLD: 25.7 % (ref 18–48)
Lab: ABNORMAL
MAGNESIUM SERPL-MCNC: 2 MG/DL (ref 1.6–2.6)
MCH RBC QN AUTO: 30.3 PG (ref 27–31)
MCHC RBC AUTO-ENTMCNC: 33.2 G/DL (ref 32–36)
MCV RBC AUTO: 91 FL (ref 82–98)
MODIFIED ALLEN'S TEST: POSITIVE
MONOCYTES # BLD AUTO: 0.4 K/UL (ref 0.3–1)
MONOCYTES NFR BLD: 6 % (ref 4–15)
NEUTROPHILS # BLD AUTO: 3.4 K/UL (ref 1.8–7.7)
NEUTROPHILS NFR BLD: 58.7 % (ref 38–73)
NOTIFIED BY: ABNORMAL
NRBC BLD-RTO: 0 /100 WBC
NT-PROBNP SERPL-MCNC: 962 PG/ML (ref 5–900)
O2DEVICE: ABNORMAL
PCO2 BLDA: 49 MMHG (ref 35–45)
PH SMN: 7.34 [PH] (ref 7.34–7.45)
PLATELET # BLD AUTO: 249 K/UL (ref 150–450)
PMV BLD AUTO: 9.8 FL (ref 9.2–12.9)
PO2 BLDA: 55.6 MMHG (ref 80–100)
POC BASE DEFICIT: 0.9 MMOL/L (ref -2–2)
POC HCO3: 24.3 MMOL/L (ref 24–28)
POC MOLECULAR INFLUENZA A AGN: NEGATIVE
POC MOLECULAR INFLUENZA B AGN: NEGATIVE
POC NOTIFIED NOTE: ABNORMAL
POC PERFORMED BY: ABNORMAL
POC SATURATED O2: 86.7 % (ref 95–100)
POC TEMPERATURE: 37 C
POTASSIUM SERPL-SCNC: 3.9 MMOL/L (ref 3.5–5.1)
PROT SERPL-MCNC: 6.5 G/DL (ref 6–8.4)
PROTHROMBIN TIME: 10.8 SEC (ref 9–12.5)
PROVIDER NOTIFIED: ABNORMAL
RBC # BLD AUTO: 4.52 M/UL (ref 4.6–6.2)
SARS-COV-2 RDRP RESP QL NAA+PROBE: NEGATIVE
SODIUM SERPL-SCNC: 145 MMOL/L (ref 136–145)
SPECIMEN SOURCE: ABNORMAL
TROPONIN I SERPL DL<=0.01 NG/ML-MCNC: 39.9 PG/ML (ref 0–60)
WBC # BLD AUTO: 5.83 K/UL (ref 3.9–12.7)

## 2023-02-10 PROCEDURE — 85025 COMPLETE CBC W/AUTO DIFF WBC: CPT | Performed by: EMERGENCY MEDICINE

## 2023-02-10 PROCEDURE — 80053 COMPREHEN METABOLIC PANEL: CPT | Performed by: EMERGENCY MEDICINE

## 2023-02-10 PROCEDURE — 36600 WITHDRAWAL OF ARTERIAL BLOOD: CPT

## 2023-02-10 PROCEDURE — 93010 ELECTROCARDIOGRAM REPORT: CPT | Mod: ,,, | Performed by: INTERNAL MEDICINE

## 2023-02-10 PROCEDURE — 87635 SARS-COV-2 COVID-19 AMP PRB: CPT | Performed by: EMERGENCY MEDICINE

## 2023-02-10 PROCEDURE — 94640 AIRWAY INHALATION TREATMENT: CPT

## 2023-02-10 PROCEDURE — 94761 N-INVAS EAR/PLS OXIMETRY MLT: CPT

## 2023-02-10 PROCEDURE — 99900031 HC PATIENT EDUCATION (STAT)

## 2023-02-10 PROCEDURE — 27000221 HC OXYGEN, UP TO 24 HOURS

## 2023-02-10 PROCEDURE — 83605 ASSAY OF LACTIC ACID: CPT | Performed by: EMERGENCY MEDICINE

## 2023-02-10 PROCEDURE — 96374 THER/PROPH/DIAG INJ IV PUSH: CPT

## 2023-02-10 PROCEDURE — 85610 PROTHROMBIN TIME: CPT | Performed by: EMERGENCY MEDICINE

## 2023-02-10 PROCEDURE — 82803 BLOOD GASES ANY COMBINATION: CPT

## 2023-02-10 PROCEDURE — 83880 ASSAY OF NATRIURETIC PEPTIDE: CPT | Performed by: EMERGENCY MEDICINE

## 2023-02-10 PROCEDURE — 25000003 PHARM REV CODE 250: Performed by: EMERGENCY MEDICINE

## 2023-02-10 PROCEDURE — 84484 ASSAY OF TROPONIN QUANT: CPT | Performed by: EMERGENCY MEDICINE

## 2023-02-10 PROCEDURE — 25000242 PHARM REV CODE 250 ALT 637 W/ HCPCS: Performed by: EMERGENCY MEDICINE

## 2023-02-10 PROCEDURE — 99285 EMERGENCY DEPT VISIT HI MDM: CPT | Mod: 25

## 2023-02-10 PROCEDURE — 99900035 HC TECH TIME PER 15 MIN (STAT)

## 2023-02-10 PROCEDURE — 93010 EKG 12-LEAD: ICD-10-PCS | Mod: ,,, | Performed by: INTERNAL MEDICINE

## 2023-02-10 PROCEDURE — 87040 BLOOD CULTURE FOR BACTERIA: CPT | Performed by: EMERGENCY MEDICINE

## 2023-02-10 PROCEDURE — 83735 ASSAY OF MAGNESIUM: CPT | Performed by: EMERGENCY MEDICINE

## 2023-02-10 RX ORDER — PROMETHAZINE HYDROCHLORIDE AND CODEINE PHOSPHATE 6.25; 1 MG/5ML; MG/5ML
5 SOLUTION ORAL EVERY 4 HOURS PRN
Qty: 118 ML | Refills: 0 | Status: SHIPPED | OUTPATIENT
Start: 2023-02-10 | End: 2023-02-20

## 2023-02-10 RX ORDER — IPRATROPIUM BROMIDE AND ALBUTEROL SULFATE 2.5; .5 MG/3ML; MG/3ML
3 SOLUTION RESPIRATORY (INHALATION) EVERY 6 HOURS PRN
Qty: 75 ML | Refills: 0 | Status: SHIPPED | OUTPATIENT
Start: 2023-02-10 | End: 2023-08-07 | Stop reason: CLARIF

## 2023-02-10 RX ORDER — FUROSEMIDE 10 MG/ML
20 INJECTION INTRAMUSCULAR; INTRAVENOUS
Status: DISCONTINUED | OUTPATIENT
Start: 2023-02-10 | End: 2023-02-10

## 2023-02-10 RX ORDER — ALBUTEROL SULFATE 90 UG/1
1-2 AEROSOL, METERED RESPIRATORY (INHALATION) EVERY 6 HOURS PRN
Qty: 8 G | Refills: 0 | Status: SHIPPED | OUTPATIENT
Start: 2023-02-10 | End: 2023-08-07 | Stop reason: CLARIF

## 2023-02-10 RX ORDER — IPRATROPIUM BROMIDE AND ALBUTEROL SULFATE 2.5; .5 MG/3ML; MG/3ML
3 SOLUTION RESPIRATORY (INHALATION)
Status: COMPLETED | OUTPATIENT
Start: 2023-02-10 | End: 2023-02-10

## 2023-02-10 RX ORDER — LABETALOL HCL 20 MG/4 ML
20 SYRINGE (ML) INTRAVENOUS
Status: COMPLETED | OUTPATIENT
Start: 2023-02-10 | End: 2023-02-10

## 2023-02-10 RX ORDER — AZITHROMYCIN 250 MG/1
TABLET, FILM COATED ORAL
Qty: 6 TABLET | Refills: 0 | Status: SHIPPED | OUTPATIENT
Start: 2023-02-10 | End: 2023-08-07 | Stop reason: CLARIF

## 2023-02-10 RX ADMIN — IPRATROPIUM BROMIDE AND ALBUTEROL SULFATE 3 ML: 2.5; .5 SOLUTION RESPIRATORY (INHALATION) at 03:02

## 2023-02-10 RX ADMIN — LABETALOL HYDROCHLORIDE 20 MG: 5 INJECTION, SOLUTION INTRAVENOUS at 03:02

## 2023-02-10 NOTE — DISCHARGE INSTRUCTIONS
Follow-up with your cardiologist 1st thing Friday morning to tell them that you have been experiencing shortness of breath and your chest x-ray showed evidence of pulmonary edema and your BNP was very mildly elevated, so you need an outpatient echocardiogram and to be evaluated for congestive heart failure.    Alternate Tylenol 1000 mg every 6 hours and Ibuprofen 800 mg every 6 hours as needed for pain/fever. In addition to the prescription cough syrup that I am writing you a prescription for, take over-the-counter Mucinex DM for cough and chest congestion. Also take over-the-counter Flonase and Zyrtec as needed for nasal/sinus congestion.  Use nebulizer treatments 3 to 4 times a day for the 1st few days of your upper respiratory infection and then as needed.  Use inhaler for rescue.    Follow all CDC/[place] department of Health guidelines regarding COVID-19 infection/social distancing/quarantine/retesting/vaccinations.    Read all discharge instructions/education provided: follow all recommendations and return precautions.  Take all medications as prescribed.  Follow up with your primary care doctor and/or specialist as directed.  Return to emergency department immediately for any new or worsening or recurrent symptoms.

## 2023-02-10 NOTE — ED PROVIDER NOTES
Encounter Date: 2/10/2023       History     Chief Complaint   Patient presents with    Cough     Patient reports cough and congestion x 2 days. Denies fever. No n/v/d. Also notes dyspnea worsening at night time.      57-year-old male comes in complaining of cough and congestion for 2 days.  No fever vomiting diarrhea.  Reports shortness of breath is worse at night.  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   HENT:  Positive for congestion. Negative for sore throat.    Respiratory:  Positive for cough. Negative for shortness of breath.    Cardiovascular:  Negative for chest pain.   Gastrointestinal:  Negative for abdominal pain and nausea.   Genitourinary:  Negative for dysuria.   Musculoskeletal:  Negative for back pain.   Skin:  Negative for rash.   Neurological:  Negative for weakness.   Hematological:  Does not bruise/bleed easily.   All other systems reviewed and are negative.    Physical Exam     Initial Vitals [02/10/23 0245]   BP Pulse Resp Temp SpO2   (!) 211/107 (!) 111 16 98.1 °F (36.7 °C) (!) 92 %      MAP       --         Physical Exam    Nursing note and vitals reviewed.  Constitutional:  He appears well-developed and well-nourished. He is not diaphoretic.   HENT:   Head: Normocephalic and atraumatic.   Eyes: EOM are normal. Pupils are equal, round, and reactive to light.   Neck: Neck supple.   Normal range of motion.  Cardiovascular:  Normal rate and regular rhythm.           Pulmonary/Chest: Breath sounds normal. No respiratory distress. He has no wheezes.   Abdominal: Abdomen is soft. Bowel sounds are normal. There is no abdominal tenderness.   Musculoskeletal:         General: No tenderness or edema. Normal range of motion.      Cervical back: Normal range of motion and neck supple.     Neurological: He is alert and oriented to person, place, and time. He has normal strength. No cranial nerve deficit or sensory deficit.   Skin: Skin is warm and dry. Capillary refill takes less than 2 seconds.   Psychiatric: He has a normal mood and affect. Thought content normal.       ED Course   Procedures  Labs Reviewed   NT-PRO NATRIURETIC PEPTIDE - Abnormal; Notable for the following components:       Result Value    NT-proBNP 962 (*)     All other components within normal limits   CBC W/ AUTO DIFFERENTIAL - Abnormal; Notable for the following components:    RBC 4.52 (*)     Hemoglobin 13.7 (*)     Eosinophil % 8.1 (*)     All other components within normal limits   COMPREHENSIVE METABOLIC PANEL - Abnormal; Notable for the following components:    Glucose 274 (*)     Albumin 3.2 (*)     AST 65 (*)     ALT 60 (*)     Anion Gap 7 (*)     All other components within normal limits   CULTURE, BLOOD   CULTURE, BLOOD   PROTIME-INR   MAGNESIUM   TROPONIN I HIGH SENSITIVITY   LACTIC ACID, PLASMA   LACTIC ACID, PLASMA   POCT INFLUENZA A/B MOLECULAR   SARS-COV-2 RDRP GENE    Narrative:     .This test utilizes isothermal nucleic acid amplification technology to detect the SARS-CoV-2 RdRp nucleic acid segment. The analytical sensitivity (limit of detection) is 500 copies/swab.     A POSITIVE result is indicative of the  presence of SARS-CoV-2 RNA; clinical correlation with patient history and other diagnostic information is necessary to determine patient infection status.    A NEGATIVE result means that SARS-CoV-2 nucleic acids are not present above the limit of detection. A NEGATIVE result should be treated as presumptive. It does not rule out the possibility of COVID-19 and should not be the sole basis for treatment decisions. If COVID-19 is strongly suspected based on clinical and exposure history, re-testing using an alternate molecular assay should be considered.     This test is only for use under the Food and Drug Administration s Emergency Use Authorization (EUA).     Commercial kits are provided by [employer]. Performance characteristics of the EUA have been independently verified by [facility] Department of Pathology and Laboratory Medicine.   _________________________________________________________________   The authorized Fact Sheet for Healthcare Providers and the authorized Fact Sheet for Patients of the ID NOW COVID-19 are available on the FDA website:    https://www.fda.gov/media/558713/download      https://www.fda.gov/media/135019/download           ECG Results              EKG 12-lead (In process)  Result time 02/10/23 03:51:25      In process by Interface, Lab In [facility] (02/10/23 03:51:25)                   Narrative:    Test Reason : R06.02,    Vent. Rate : 099 BPM     Atrial Rate : 099 BPM     P-R Int : 180 ms          QRS Dur : 088 ms      QT Int : 388 ms       P-R-T Axes : 058 013 156 degrees     QTc Int : 497 ms    Normal sinus rhythm  Low voltage QRS  Inferior infarct ,age undetermined  Cannot rule out Anterior infarct ,age undetermined  ST and T wave abnormality, consider lateral ischemia  Abnormal ECG  When compared with ECG of 04-OCT-2016 20:54,  Minimal criteria for Anterior infarct are now Present  Inferior infarct is now Present  ST now depressed in Lateral leads  T wave  inversion now evident in Anterior-lateral leads    Referred By: [email or web address]   SELF           Confirmed By:                                   Imaging Results              X-Ray Chest AP Portable (In process)                      Medications   albuterol-ipratropium 2.5 mg-0.5 mg/3 mL nebulizer solution 3 mL (3 mLs Nebulization Given 2/10/23 0329)   labetalol 20 mg/4 mL (5 mg/mL) IV syring (20 mg Intravenous Given 2/10/23 0320)                Attending Attestation:             Attending ED Notes:   57-year-old male comes in complaining of cough and congestion for 2 days.  No fever vomiting diarrhea.  Reports shortness of breath is worse at night.  Patient has a history of stroke heart attack hypertension but no known or documented history of CHF or COPD    Labs and imaging revealed no acute pathology/abnormality other than chest x-ray consistent with mild pulmonary edema and possible infiltrates along with very mildly elevated BNP.  Patient will follow-up with cardiology regarding this and I told him he needs an echocardiogram.  Patient given breathing treatment and labetalol in emergency department and feels completely fine after treatment.  Presentation consistent with pulmonary edema and likely URI.  Ventolin DuoNeb azithromycin prescriptions given.  Patient is non-toxic appearing, in no acute distress, and vital signs are stable and normal upon discharge. Upon completion of ED evaluation and management, with consideration of thorough differential diagnosis, the patient was found to have no acutely abnormal physical exam findings or other pathology requiring further emergent intervention or admission at this time. Patient/caregiver has no complaints upon discharge and verbalizes understanding and agreement with diagnosis and treatment plan. Discharge instructions with return precautions provided. Patient/caregiver verbalizes understanding to return to ED immediately for any new or worsening symptoms and to follow up  with PCP/specialist recommended in 1-2 days.                          Clinical Impression:   Final diagnoses:  [R06.02] SOB (shortness of breath)  [R05.9] Cough, unspecified type (Primary)  [J06.9] Upper respiratory tract infection, unspecified type  [R79.89] Elevated brain natriuretic peptide (BNP) level  [R09.02] Hypoxia  [J81.0] Acute pulmonary edema        ED Disposition Condition    Discharge Stable          ED Prescriptions       Medication Sig Dispense Start Date End Date Auth. Provider    albuterol (PROVENTIL/VENTOLIN HFA) 90 mcg/actuation inhaler Inhale 1-2 puffs into the lungs every 6 (six) hours as needed for Wheezing or Shortness of Breath. Rescue 8 g 2/10/2023 -- Diandra Israel MD    albuterol-ipratropium (DUO-NEB) 2.5 mg-0.5 mg/3 mL nebulizer solution Take 3 mLs by nebulization every 6 (six) hours as needed for Wheezing or Shortness of Breath. Rescue 75 mL 2/10/2023 2/10/2024 Diandra Israel MD    promethazine-codeine 6.25-10 mg/5 ml (PHENERGAN WITH CODEINE) 6.25-10 mg/5 mL syrup Take 5 mLs by mouth every 4 (four) hours as needed for Cough. 118 mL 2/10/2023 2/20/2023 Diandra Israel MD    azithromycin (Z-MATI) 250 MG tablet Take 2 tablets by mouth on day 1; Take 1 tablet by mouth on days 2-5 6 tablet 2/10/2023 -- Diandra Israel MD          Follow-up Information       Follow up With Specialties Details Why Contact Info Additional Information    Your primary care doctor and your cardiologist  Schedule an appointment as soon as possible for a visit today       [place] - Emergency Department Emergency Medicine Go to  As needed, If symptoms worsen [address] [place] [address] [phone] Floor 1             Diandra Israel MD  02/10/23 0599

## 2023-02-15 LAB
BACTERIA BLD CULT: NORMAL
BACTERIA BLD CULT: NORMAL

## 2023-07-03 ENCOUNTER — OFFICE VISIT (OUTPATIENT)
Dept: SURGERY | Facility: CLINIC | Age: 58
End: 2023-07-03
Payer: MEDICARE

## 2023-07-03 VITALS
SYSTOLIC BLOOD PRESSURE: 201 MMHG | BODY MASS INDEX: 34.28 KG/M2 | HEART RATE: 81 BPM | WEIGHT: 258.69 LBS | DIASTOLIC BLOOD PRESSURE: 110 MMHG | HEIGHT: 73 IN | OXYGEN SATURATION: 93 %

## 2023-07-03 DIAGNOSIS — Z01.818 PRE-OP TESTING: ICD-10-CM

## 2023-07-03 DIAGNOSIS — Z12.11 SCREEN FOR COLON CANCER: Primary | ICD-10-CM

## 2023-07-03 PROCEDURE — 99204 OFFICE O/P NEW MOD 45 MIN: CPT | Mod: S$GLB,,, | Performed by: STUDENT IN AN ORGANIZED HEALTH CARE EDUCATION/TRAINING PROGRAM

## 2023-07-03 PROCEDURE — 3008F BODY MASS INDEX DOCD: CPT | Mod: CPTII,S$GLB,, | Performed by: STUDENT IN AN ORGANIZED HEALTH CARE EDUCATION/TRAINING PROGRAM

## 2023-07-03 PROCEDURE — 99999 PR PBB SHADOW E&M-EST. PATIENT-LVL V: CPT | Mod: PBBFAC,,, | Performed by: STUDENT IN AN ORGANIZED HEALTH CARE EDUCATION/TRAINING PROGRAM

## 2023-07-03 PROCEDURE — 4010F PR ACE/ARB THEARPY RXD/TAKEN: ICD-10-PCS | Mod: CPTII,S$GLB,, | Performed by: STUDENT IN AN ORGANIZED HEALTH CARE EDUCATION/TRAINING PROGRAM

## 2023-07-03 PROCEDURE — 3008F PR BODY MASS INDEX (BMI) DOCUMENTED: ICD-10-PCS | Mod: CPTII,S$GLB,, | Performed by: STUDENT IN AN ORGANIZED HEALTH CARE EDUCATION/TRAINING PROGRAM

## 2023-07-03 PROCEDURE — 99204 PR OFFICE/OUTPT VISIT, NEW, LEVL IV, 45-59 MIN: ICD-10-PCS | Mod: S$GLB,,, | Performed by: STUDENT IN AN ORGANIZED HEALTH CARE EDUCATION/TRAINING PROGRAM

## 2023-07-03 PROCEDURE — 3077F PR MOST RECENT SYSTOLIC BLOOD PRESSURE >= 140 MM HG: ICD-10-PCS | Mod: CPTII,S$GLB,, | Performed by: STUDENT IN AN ORGANIZED HEALTH CARE EDUCATION/TRAINING PROGRAM

## 2023-07-03 PROCEDURE — 4010F ACE/ARB THERAPY RXD/TAKEN: CPT | Mod: CPTII,S$GLB,, | Performed by: STUDENT IN AN ORGANIZED HEALTH CARE EDUCATION/TRAINING PROGRAM

## 2023-07-03 PROCEDURE — 1159F MED LIST DOCD IN RCRD: CPT | Mod: CPTII,S$GLB,, | Performed by: STUDENT IN AN ORGANIZED HEALTH CARE EDUCATION/TRAINING PROGRAM

## 2023-07-03 PROCEDURE — 3077F SYST BP >= 140 MM HG: CPT | Mod: CPTII,S$GLB,, | Performed by: STUDENT IN AN ORGANIZED HEALTH CARE EDUCATION/TRAINING PROGRAM

## 2023-07-03 PROCEDURE — 99999 PR PBB SHADOW E&M-EST. PATIENT-LVL V: ICD-10-PCS | Mod: PBBFAC,,, | Performed by: STUDENT IN AN ORGANIZED HEALTH CARE EDUCATION/TRAINING PROGRAM

## 2023-07-03 PROCEDURE — 3080F DIAST BP >= 90 MM HG: CPT | Mod: CPTII,S$GLB,, | Performed by: STUDENT IN AN ORGANIZED HEALTH CARE EDUCATION/TRAINING PROGRAM

## 2023-07-03 PROCEDURE — 3080F PR MOST RECENT DIASTOLIC BLOOD PRESSURE >= 90 MM HG: ICD-10-PCS | Mod: CPTII,S$GLB,, | Performed by: STUDENT IN AN ORGANIZED HEALTH CARE EDUCATION/TRAINING PROGRAM

## 2023-07-03 PROCEDURE — 1159F PR MEDICATION LIST DOCUMENTED IN MEDICAL RECORD: ICD-10-PCS | Mod: CPTII,S$GLB,, | Performed by: STUDENT IN AN ORGANIZED HEALTH CARE EDUCATION/TRAINING PROGRAM

## 2023-07-03 RX ORDER — SODIUM, POTASSIUM,MAG SULFATES 17.5-3.13G
1 SOLUTION, RECONSTITUTED, ORAL ORAL 2 TIMES DAILY
Qty: 1 KIT | Refills: 0 | Status: SHIPPED | OUTPATIENT
Start: 2023-07-03 | End: 2023-07-04

## 2023-07-03 NOTE — H&P
"[facility] H&P      Consult: Screening colonoscopy   Consulting Service: Dr. Boles  Chief Complaint: None        HPI: Pt is a 58 y.o.male with PMH sig for CVA and MI s/p PCI on Brilinta who presents for routine screening colonoscopy.   No personal or family history of colon cancer.  Has daily regular bowel movements.  Denies melena, rectal bleeding or pain, change in bowel habits or unintended weight loss.  Denies CP, SOB, abdominal pain, nausea, vomiting, fevers, or chills.        PMH:   Past Medical History:   Diagnosis Date    Cytotoxic cerebral edema 10/5/2016    Diabetes mellitus     Embolic stroke involving left middle cerebral artery 10/4/2016    Heart attack     Hypertension     Smoker 10/4/2016     PSH:   Past Surgical History:   Procedure Laterality Date    CARDIAC CATHETERIZATION      CORONARY ANGIOPLASTY WITH STENT PLACEMENT      FEMORAL ARTERY STENT       Meds: See medication list;   Brilinta  ALL: Penicillins  FHX: non contributory   SOC:   Social History     Socioeconomic History    Marital status: Single   Tobacco Use    Smoking status: Light Smoker    Smokeless tobacco: Never   Substance and Sexual Activity    Alcohol use: No     ROS: Review of Systems   Constitutional:  Negative for chills, fever, malaise/fatigue and weight loss.   Respiratory:  Negative for cough.    Cardiovascular:  Negative for chest pain.   Gastrointestinal:  Negative for abdominal pain, blood in stool, constipation, diarrhea, heartburn, melena, nausea and vomiting.   All other systems reviewed and are negative.        Physical Exam:  BP (!) 201/110 (BP Location: Right arm, Patient Position: Sitting, BP Method: Large (Automatic))   Pulse 81   Ht 6' 1" (1.854 m)   Wt 117.4 kg (258 lb 11.4 oz)   SpO2 (!) 93%   BMI 34.13 kg/m²   Physical Exam  Constitutional:       General: He is not in acute distress.     Appearance: He is not ill-appearing or toxic-appearing.   Cardiovascular:      Rate and " Rhythm: Normal rate and regular rhythm.   Pulmonary:      Effort: Pulmonary effort is normal. No respiratory distress.   Abdominal:      General: There is no distension.      Palpations: Abdomen is soft.      Tenderness: There is no abdominal tenderness. There is no guarding or rebound.   Skin:     General: Skin is warm and dry.      Capillary Refill: Capillary refill takes less than 2 seconds.   Neurological:      Mental Status: He is alert.         A/P: Pt is a 58 y.o.male who presents for routine screening colonoscopy  --To [facility] on 8/16 for colonoscopy  --Procedure in detail explained to patient;  including risks including but not limited to bleeding, infection, damage to surrounding structures, and perforation- patient voiced understanding  --CBC, CMP, CXR, EKG   --Bowel prep given - Suprep  --CLD day before procedure  -Cards clearance  -Hold brilinta for 5 days before        Isabelle Shore MD  General Surgery   [phone]

## 2023-07-17 RX ORDER — SODIUM CHLORIDE 9 MG/ML
INJECTION, SOLUTION INTRAVENOUS CONTINUOUS
Status: CANCELLED | OUTPATIENT
Start: 2023-07-17

## 2023-07-17 RX ORDER — SODIUM CHLORIDE 0.9 % (FLUSH) 0.9 %
10 SYRINGE (ML) INJECTION
Status: CANCELLED | OUTPATIENT
Start: 2023-07-17

## 2023-08-07 ENCOUNTER — HOSPITAL ENCOUNTER (OUTPATIENT)
Dept: PULMONOLOGY | Facility: HOSPITAL | Age: 58
Discharge: HOME | End: 2023-08-07
Attending: STUDENT IN AN ORGANIZED HEALTH CARE EDUCATION/TRAINING PROGRAM
Payer: MEDICARE

## 2023-08-07 ENCOUNTER — HOSPITAL ENCOUNTER (OUTPATIENT)
Dept: PREADMISSION TESTING | Facility: HOSPITAL | Age: 58
Discharge: HOME | End: 2023-08-07
Attending: STUDENT IN AN ORGANIZED HEALTH CARE EDUCATION/TRAINING PROGRAM
Payer: MEDICARE

## 2023-08-07 VITALS — HEIGHT: 73 IN | WEIGHT: 258 LBS | BODY MASS INDEX: 34.19 KG/M2

## 2023-08-07 DIAGNOSIS — Z12.11 SCREEN FOR COLON CANCER: ICD-10-CM

## 2023-08-07 DIAGNOSIS — Z01.818 PRE-OP TESTING: ICD-10-CM

## 2023-08-07 PROCEDURE — 93010 ELECTROCARDIOGRAM REPORT: CPT | Mod: ,,, | Performed by: INTERNAL MEDICINE

## 2023-08-07 PROCEDURE — 93005 ELECTROCARDIOGRAM TRACING: CPT

## 2023-08-07 PROCEDURE — 93010 EKG 12-LEAD: ICD-10-PCS | Mod: ,,, | Performed by: INTERNAL MEDICINE

## 2023-08-15 ENCOUNTER — ANESTHESIA EVENT (OUTPATIENT)
Dept: ENDOSCOPY | Facility: HOSPITAL | Age: 58
End: 2023-08-15
Payer: MEDICARE

## 2023-08-15 NOTE — ANESTHESIA PREPROCEDURE EVALUATION
08/15/2023  Derek Kirk Jr. is a 58 y.o., male.      Pre-op Assessment    I have reviewed the Patient Summary Reports.    I have reviewed the NPO Status.   I have reviewed the Medications.     Review of Systems  Anesthesia Hx:  No problems with previous Anesthesia  Denies Family Hx of Anesthesia complications.   Denies Personal Hx of Anesthesia complications.   Social:  Smoker    Cardiovascular:   Hypertension, well controlled Past MI CAD asymptomatic CABG/stent  ECG has been reviewed. Solet cleared   Renal/:  Renal/ Normal     Hepatic/GI:  Hepatic/GI Normal    Neurological:   CVA, no residual symptoms    Endocrine:   Diabetes, well controlled, type 2      Lab Results   Component Value Date    WBC 5.66 08/07/2023    HGB 13.3 (L) 08/07/2023    HCT 39.8 (L) 08/07/2023    MCV 92 08/07/2023     08/07/2023     CMP  Sodium   Date Value Ref Range Status   08/07/2023 139 136 - 145 mmol/L Final     Potassium   Date Value Ref Range Status   08/07/2023 3.7 3.5 - 5.1 mmol/L Final     Chloride   Date Value Ref Range Status   08/07/2023 109 95 - 110 mmol/L Final     CO2   Date Value Ref Range Status   08/07/2023 25 23 - 29 mmol/L Final     Glucose   Date Value Ref Range Status   08/07/2023 245 (H) 70 - 110 mg/dL Final     BUN   Date Value Ref Range Status   08/07/2023 12 6 - 20 mg/dL Final     Creatinine   Date Value Ref Range Status   08/07/2023 1.0 0.5 - 1.4 mg/dL Final     Calcium   Date Value Ref Range Status   08/07/2023 8.8 8.7 - 10.5 mg/dL Final     Total Protein   Date Value Ref Range Status   08/07/2023 6.4 6.0 - 8.4 g/dL Final     Albumin   Date Value Ref Range Status   08/07/2023 3.1 (L) 3.5 - 5.2 g/dL Final     Total Bilirubin   Date Value Ref Range Status   08/07/2023 0.3 0.1 - 1.0 mg/dL Final     Comment:     For infants and newborns, interpretation of results should be based  on gestational  age, weight and in agreement with clinical  observations.    Premature Infant recommended reference ranges:  Up to 24 hours.............<8.0 mg/dL  Up to 48 hours............<12.0 mg/dL  3-5 days..................<15.0 mg/dL  6-29 days.................<15.0 mg/dL    For patients on Eltrombopag therapy, use of Dimension [place] TBIL is   not   recommended.       Alkaline Phosphatase   Date Value Ref Range Status   08/07/2023 66 55 - 135 U/L Final     AST   Date Value Ref Range Status   08/07/2023 11 10 - 40 U/L Final     ALT   Date Value Ref Range Status   08/07/2023 23 10 - 44 U/L Final     Anion Gap   Date Value Ref Range Status   08/07/2023 5 (L) 8 - 16 mmol/L Final     eGFR   Date Value Ref Range Status   08/07/2023 >60.0 >60 mL/min/1.73 m^2 Final   04/25/2020 108 >60- mL/min/1.73m Final       Physical Exam  General: Well nourished    Airway:  Mallampati: III / II  Mouth Opening: Normal  TM Distance: Normal  Tongue: Normal  Neck ROM: Normal ROM    Dental:    Chest/Lungs:  Clear to auscultation    Heart:  Rate: Normal  Rhythm: Regular Rhythm  Sounds: Normal        Anesthesia Plan  Type of Anesthesia, risks & benefits discussed:    Anesthesia Type: MAC  Intra-op Monitoring Plan: Standard ASA Monitors  Post Op Pain Control Plan: multimodal analgesia  Induction:  IV  Airway Plan: Direct  Informed Consent: Informed consent signed with the Patient and all parties understand the risks and agree with anesthesia plan.  All questions answered.   ASA Score: 4  Day of Surgery Review of History & Physical: I have interviewed and examined the patient. I have reviewed the patient's H&P dated: There are no significant changes.     Ready For Surgery From Anesthesia Perspective.     .

## 2023-08-16 ENCOUNTER — HOSPITAL ENCOUNTER (OUTPATIENT)
Facility: HOSPITAL | Age: 58
Discharge: HOME | End: 2023-08-16
Attending: STUDENT IN AN ORGANIZED HEALTH CARE EDUCATION/TRAINING PROGRAM | Admitting: STUDENT IN AN ORGANIZED HEALTH CARE EDUCATION/TRAINING PROGRAM
Payer: MEDICARE

## 2023-08-16 ENCOUNTER — ANESTHESIA (OUTPATIENT)
Dept: ENDOSCOPY | Facility: HOSPITAL | Age: 58
End: 2023-08-16
Payer: MEDICARE

## 2023-08-16 VITALS
SYSTOLIC BLOOD PRESSURE: 120 MMHG | DIASTOLIC BLOOD PRESSURE: 59 MMHG | TEMPERATURE: 98 F | RESPIRATION RATE: 16 BRPM | OXYGEN SATURATION: 97 % | HEART RATE: 84 BPM

## 2023-08-16 DIAGNOSIS — Z12.11 SCREEN FOR COLON CANCER: Primary | ICD-10-CM

## 2023-08-16 LAB — POCT GLUCOSE: 203 MG/DL (ref 70–110)

## 2023-08-16 PROCEDURE — 25000003 PHARM REV CODE 250: Performed by: STUDENT IN AN ORGANIZED HEALTH CARE EDUCATION/TRAINING PROGRAM

## 2023-08-16 PROCEDURE — 37000008 HC ANESTHESIA 1ST 15 MINUTES: Performed by: STUDENT IN AN ORGANIZED HEALTH CARE EDUCATION/TRAINING PROGRAM

## 2023-08-16 PROCEDURE — G0121 COLON CA SCRN NOT HI RSK IND: ICD-10-PCS | Mod: ,,, | Performed by: STUDENT IN AN ORGANIZED HEALTH CARE EDUCATION/TRAINING PROGRAM

## 2023-08-16 PROCEDURE — 37000009 HC ANESTHESIA EA ADD 15 MINS: Performed by: STUDENT IN AN ORGANIZED HEALTH CARE EDUCATION/TRAINING PROGRAM

## 2023-08-16 PROCEDURE — G0121 COLON CA SCRN NOT HI RSK IND: HCPCS | Performed by: STUDENT IN AN ORGANIZED HEALTH CARE EDUCATION/TRAINING PROGRAM

## 2023-08-16 PROCEDURE — G0121 COLON CA SCRN NOT HI RSK IND: HCPCS | Mod: ,,, | Performed by: STUDENT IN AN ORGANIZED HEALTH CARE EDUCATION/TRAINING PROGRAM

## 2023-08-16 RX ORDER — SODIUM CHLORIDE 0.9 % (FLUSH) 0.9 %
10 SYRINGE (ML) INJECTION
Status: DISCONTINUED | OUTPATIENT
Start: 2023-08-16 | End: 2023-08-16 | Stop reason: HOSPADM

## 2023-08-16 RX ORDER — LIDOCAINE HYDROCHLORIDE 10 MG/ML
INJECTION, SOLUTION INTRAVENOUS
Status: DISCONTINUED | OUTPATIENT
Start: 2023-08-16 | End: 2023-08-16

## 2023-08-16 RX ORDER — PROPOFOL 10 MG/ML
VIAL (ML) INTRAVENOUS
Status: DISCONTINUED | OUTPATIENT
Start: 2023-08-16 | End: 2023-08-16

## 2023-08-16 RX ORDER — SODIUM CHLORIDE 9 MG/ML
INJECTION, SOLUTION INTRAVENOUS CONTINUOUS
Status: DISCONTINUED | OUTPATIENT
Start: 2023-08-16 | End: 2023-08-16 | Stop reason: HOSPADM

## 2023-08-16 RX ADMIN — Medication 40 MG: at 07:08

## 2023-08-16 RX ADMIN — SODIUM CHLORIDE: 9 INJECTION, SOLUTION INTRAVENOUS at 06:08

## 2023-08-16 RX ADMIN — Medication 30 MG: at 07:08

## 2023-08-16 RX ADMIN — Medication 120 MG: at 07:08

## 2023-08-16 RX ADMIN — LIDOCAINE HYDROCHLORIDE 50 MG: 10 INJECTION, SOLUTION INTRAVENOUS at 07:08

## 2023-08-16 NOTE — LETTER
August 16, 2023    Derek Kirk Jr.  [address] [address]                   [address] [facility] [address]  Phone: [phone]  Fax: [phone]   Dear Mr. Derek Kirk:    Thank you for choosing [facility] for your recent colonoscopy!  We strive to provide our patients with the highest quality preventative care because your health is worth it!    Your recent colonoscopy with Dr. Shore was normal.     Since you do not have a family history of colon or rectal cancer, it is recommended that you undergo another colonoscopy in 10 years.    I would also recommend colon cancer screening for any first-degree relatives (brothers, sisters, children, and parents) beginning at age 45. Make your family members aware of these results so that they can discuss screening with their physician    I would suggest you consider changing any health habits that might increase your chance of forming more precancerous polyps and thus colorectal cancer. You may lower your chance of developing future polyps and colorectal cancer by adopting healthy habits such as not smoking, maintaining a healthy body weight, being physically active, limiting red and processed meat (such as beef, cold cuts, foster, and hot dogs),minimizing alcohol intake (or avoiding alcohol altogether), and eating a diet with a lot of fruits and vegetables.    Please feel free to contact me at [phone] with any questions or concerns.    Thank you for allowing me to participate in your care!    Sincerely,     Isabelle Shore MD

## 2023-08-16 NOTE — DISCHARGE INSTRUCTIONS
FOLLOW UP WITH DR DUMONT AS INSTRUCTED.    NO DRIVING OR DRINKING ALCOHOL FOR 24 HOURS.    CALL DR DUMONT'S OFFICE FOR ANY QUESTIONS OR CONCERNS.  REPORT TO THE ER IF URGENT.    THANK YOU FOR CHOOSING [facility]!

## 2023-08-16 NOTE — TRANSFER OF CARE
Anesthesia Transfer of Care Note    Patient: Derek Kirk Jr.    Procedure(s) Performed: Procedure(s) (LRB):  COLONOSCOPY (N/A)    Patient location: GI    Anesthesia Type: MAC    Transport from OR: Transported from OR on room air with adequate spontaneous ventilation    Post pain: adequate analgesia    Post assessment: no apparent anesthetic complications    Post vital signs: stable    Level of consciousness: awake    Nausea/Vomiting: no nausea/vomiting    Complications: none    Transfer of care protocol was followed      Last vitals:   HR 80  RR 16  T 36.8  SPO2 100  /59

## 2023-08-16 NOTE — ANESTHESIA POSTPROCEDURE EVALUATION
Anesthesia Post Evaluation    Patient: Derek Kirk Jr.    Procedure(s) Performed: Procedure(s) (LRB):  COLONOSCOPY (N/A)    Final Anesthesia Type: MAC      Patient location during evaluation: OPS  Patient participation: Yes- Able to Participate  Level of consciousness: awake  Post-procedure vital signs: reviewed and stable  Pain management: adequate  Airway patency: patent    PONV status at discharge: No PONV  Anesthetic complications: no      Cardiovascular status: blood pressure returned to baseline  Respiratory status: spontaneous ventilation  Hydration status: euvolemic  Follow-up not needed.          Vitals Value Taken Time   /59 08/16/23 0803   Temp 36.4 °C (97.6 °F) 08/16/23 0803   Pulse 84 08/16/23 0803   Resp 16 08/16/23 0803   SpO2 97 % 08/16/23 0803         No case tracking events are documented in the log.      Pain/Gennaro Score: Gennaro Score: 9 (8/16/2023  8:03 AM)

## 2023-08-16 NOTE — H&P
Patient seen and examined.  No interval change since the below obtained H&P  Informed consent verified    NPO since midnight  Prep Suprep; last BM liquid/brown  Brilinta held  All questions and concerns addressed  To Endo for screening colonoscopy     Isabelle Shore MD  General Surgery   [phone]      [facility]  General Surgery Clinic H&P      Consult: Screening colonoscopy   Consulting Service: Dr. Boles  Chief Complaint: None        HPI: Pt is a 58 y.o.male with PMH sig for CVA and MI s/p PCI on Brilinta who presents for routine screening colonoscopy.   No personal or family history of colon cancer.  Has daily regular bowel movements.  Denies melena, rectal bleeding or pain, change in bowel habits or unintended weight loss.  Denies CP, SOB, abdominal pain, nausea, vomiting, fevers, or chills.        PMH:   Past Medical History:   Diagnosis Date    Cytotoxic cerebral edema 10/5/2016    Diabetes mellitus     Embolic stroke involving left middle cerebral artery 10/4/2016    Heart attack     Hypertension     Smoker 10/4/2016     PSH:   Past Surgical History:   Procedure Laterality Date    CARDIAC CATHETERIZATION      CORONARY ANGIOPLASTY WITH STENT PLACEMENT      FEMORAL ARTERY STENT       Meds: See medication list;   Brilinta  ALL: Penicillins  FHX: non contributory   SOC:   Social History     Socioeconomic History    Marital status: Single   Tobacco Use    Smoking status: Light Smoker     Current packs/day: 0.00    Smokeless tobacco: Never   Substance and Sexual Activity    Alcohol use: No    Drug use: Never    Sexual activity: Not Currently     ROS: Review of Systems   Constitutional:  Negative for chills, fever, malaise/fatigue and weight loss.   Respiratory:  Negative for cough.    Cardiovascular:  Negative for chest pain.   Gastrointestinal:  Negative for abdominal pain, blood in stool, constipation, diarrhea, heartburn, melena, nausea and vomiting.   All other systems reviewed and are  negative.          Physical Exam:  BP (!) 174/76 (Patient Position: Lying)   Pulse 94   Temp 98 °F (36.7 °C) (Oral)   Resp 20   SpO2 96%   Physical Exam  Constitutional:       General: He is not in acute distress.     Appearance: He is not ill-appearing or toxic-appearing.   Cardiovascular:      Rate and Rhythm: Normal rate and regular rhythm.   Pulmonary:      Effort: Pulmonary effort is normal. No respiratory distress.   Abdominal:      General: There is no distension.      Palpations: Abdomen is soft.      Tenderness: There is no abdominal tenderness. There is no guarding or rebound.   Skin:     General: Skin is warm and dry.      Capillary Refill: Capillary refill takes less than 2 seconds.   Neurological:      Mental Status: He is alert.           A/P: Pt is a 58 y.o.male who presents for routine screening colonoscopy  --To Endo on 8/16 for colonoscopy  --Procedure in detail explained to patient;  including risks including but not limited to bleeding, infection, damage to surrounding structures, and perforation- patient voiced understanding  --CBC, CMP, CXR, EKG   --Bowel prep given - Suprep  --CLD day before procedure  -Cards clearance  -Hold brilinta for 5 days before        Isabelle Shore MD  General Surgery   [phone]

## 2023-08-16 NOTE — PLAN OF CARE
RECEIVED PT TO ROOM 512 ACCOMPANIED BY RUBENS BREWER, PT DROWSY BUT EASILY AROUSABLE WITH VERBAL STIMULI. NO C/O. SNACK AVAILABLE. CALL ELMORE IN REACH, CALL WITH NEEDS.

## 2023-08-16 NOTE — OP NOTE
[facility] - OR Periop Services  General Surgery Department  Operative Note    SUMMARY     Date of Procedure: 8/16/2023    Procedure: Procedure(s) (LRB):  COLONOSCOPY:      Surgeon(s) and Role:  Isabelle Shore MD     Pre-Operative Diagnosis: Pre-Op Diagnosis Codes:     * Screen for colon cancer [Z12.11]    Post-Operative Diagnosis: Same         Anesthesia: Local MAC      Findings:  -No masses or polypoid lesions seen.    -There was normal mucosa with normal submucosal venous pattern.    -No vascular lesions were identified.    -No major diverticular disease was encountered.       Indications for the Procedure:  59yo male with no family history of colorectal cancer who presents for --screening colonoscopy for screening for colon cancer.    Operative Conduct in Detail:   The risks, benefits, and alternatives were thoroughly discussed with the patient. Despite the risks, the patient wished to proceed. Informed consent was obtained and it will scanned to the electronic chart.      The patient was placed on left lateral decubitus. After achieving moderate sedation, a digital rectal examination was performed; subsequently, a standard colonoscope was introduced through the anus and advanced without difficulty up to the cecum where terminal ileum and appendiceal orifice were visualized The scope was withdrawn slowly while the mucosa was carefully examined. The following findings were seen:    Bowel Preparation: good  Rectal exam was normal with good rectal tone and no masses or blood detected in the rectal vault.   -No masses or polypoid lesions seen.    -There was normal mucosa with normal submucosal venous pattern.    -No vascular lesions were identified.    -No major diverticular disease was encountered.       Withdrawal time >6 minutes (if no biopsies/polypectomies obtained)      Complications: No    Estimated Blood Loss (EBL): None             Specimens:   Specimens (From admission, onward)      None                    Condition: Good    Disposition: PACU - hemodynamically stable.    Recommendation:  Repeat colonoscopy in 10 years per guidelines      Isabelle Shore MD  General Surgery   [phone]

## 2023-08-16 NOTE — DISCHARGE SUMMARY
[place] - Endoscopy  Discharge Note  Short Stay    Procedure(s) (LRB):  COLONOSCOPY (N/A)      OUTCOME: Patient tolerated treatment/procedure well without complication and is now ready for discharge.    DISPOSITION: Home or Self Care    FINAL DIAGNOSIS:  Screen for colon cancer    FOLLOWUP: In clinic    DISCHARGE INSTRUCTIONS:    Discharge Procedure Orders   Diet Adult Regular     No driving until:     Notify your health care provider if you experience any of the following:  temperature >100.4     Notify your health care provider if you experience any of the following:  persistent nausea and vomiting or diarrhea     Notify your health care provider if you experience any of the following:  severe uncontrolled pain     Activity as tolerated        TIME SPENT ON DISCHARGE: 5 minutes

## 2024-01-24 ENCOUNTER — HOSPITAL ENCOUNTER (EMERGENCY)
Facility: HOSPITAL | Age: 59
Discharge: HOME | End: 2024-01-24
Attending: EMERGENCY MEDICINE
Payer: MEDICARE

## 2024-01-24 VITALS
DIASTOLIC BLOOD PRESSURE: 118 MMHG | RESPIRATION RATE: 18 BRPM | HEART RATE: 87 BPM | WEIGHT: 256 LBS | TEMPERATURE: 98 F | BODY MASS INDEX: 33.93 KG/M2 | SYSTOLIC BLOOD PRESSURE: 219 MMHG | HEIGHT: 73 IN | OXYGEN SATURATION: 98 %

## 2024-01-24 DIAGNOSIS — K04.01 ACUTE PULPITIS: Primary | ICD-10-CM

## 2024-01-24 PROCEDURE — 96372 THER/PROPH/DIAG INJ SC/IM: CPT

## 2024-01-24 PROCEDURE — 63600175 PHARM REV CODE 636 W HCPCS

## 2024-01-24 PROCEDURE — 25000003 PHARM REV CODE 250

## 2024-01-24 PROCEDURE — 99284 EMERGENCY DEPT VISIT MOD MDM: CPT

## 2024-01-24 RX ORDER — KETOROLAC TROMETHAMINE 30 MG/ML
30 INJECTION, SOLUTION INTRAMUSCULAR; INTRAVENOUS
Status: COMPLETED | OUTPATIENT
Start: 2024-01-24 | End: 2024-01-24

## 2024-01-24 RX ORDER — CLONIDINE HYDROCHLORIDE 0.2 MG/1
0.2 TABLET ORAL
Status: COMPLETED | OUTPATIENT
Start: 2024-01-24 | End: 2024-01-24

## 2024-01-24 RX ORDER — CLINDAMYCIN HYDROCHLORIDE 150 MG/1
450 CAPSULE ORAL EVERY 8 HOURS
Qty: 63 CAPSULE | Refills: 0 | Status: SHIPPED | OUTPATIENT
Start: 2024-01-24 | End: 2024-01-31

## 2024-01-24 RX ADMIN — KETOROLAC TROMETHAMINE 30 MG: 30 INJECTION, SOLUTION INTRAMUSCULAR; INTRAVENOUS at 02:01

## 2024-01-24 RX ADMIN — CLONIDINE HYDROCHLORIDE 0.2 MG: 0.2 TABLET ORAL at 02:01

## 2024-01-24 NOTE — ED PROVIDER NOTES
Encounter Date: 1/24/2024       History     Chief Complaint   Patient presents with    Dental Pain     Bad tooth to the bottom left side of mouth causing headache.  Pain x 3-4 days but doesn't have appt with dentist until next month.     58-year-old male with history of diabetes, hypertension presents to ED with complaints of left-sided dental pain for 3 days.  No improvement with Tylenol at home.  Reports he was dentist appointment next month to have his teeth pulled.  Denies any chest pain or shortness of breath.     The history is provided by the patient.     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/2016    Diabetes mellitus     Embolic stroke involving left middle cerebral artery 10/4/2016    Heart attack     Hypertension     Smoker 10/4/2016     Past Surgical History:   Procedure Laterality Date    CARDIAC CATHETERIZATION      COLONOSCOPY N/A 8/16/2023    Procedure: COLONOSCOPY;  Surgeon: Isabelle Shore MD;  Location: [facility];  Service: General;  Laterality: N/A;  1st 0600    CORONARY ANGIOPLASTY WITH STENT PLACEMENT      FEMORAL ARTERY STENT       Family History   Problem Relation Age of Onset    Hypertension Mother     Hypertension Father      Social History     Tobacco Use    Smoking status: Light Smoker    Smokeless tobacco: Never   Substance Use Topics    Alcohol use: No    Drug use: Never     Review of Systems   Constitutional:  Negative for fever.   HENT:  Positive for dental problem. Negative for sore throat.    Eyes: Negative.    Respiratory:  Negative for shortness of breath.    Cardiovascular:  Negative for chest pain.   Gastrointestinal:  Negative for nausea.   Endocrine: Negative.    Genitourinary:  Negative for dysuria.   Musculoskeletal:  Negative for back pain.   Skin:  Negative for rash.   Allergic/Immunologic: Negative.    Neurological:  Negative for weakness.    Hematological:  Does not bruise/bleed easily.   Psychiatric/Behavioral: Negative.         Physical Exam     Initial Vitals [01/24/24 1424]   BP Pulse Resp Temp SpO2   (!) 222/115 87 18 98.1 °F (36.7 °C) 98 %      MAP       --         Physical Exam    Nursing note and vitals reviewed.  Constitutional: He appears well-developed and well-nourished.   HENT:   Head: Normocephalic and atraumatic.   Mouth/Throat: Oropharynx is clear and moist and mucous membranes are normal. No trismus in the jaw. Abnormal dentition. Dental caries present. No dental abscesses or uvula swelling.   Eyes: EOM are normal.   Neck: Neck supple.   Normal range of motion.  Cardiovascular:  Normal rate and regular rhythm.           Pulmonary/Chest: No respiratory distress.   Abdominal: He exhibits no distension.   Musculoskeletal:         General: Normal range of motion.      Cervical back: Normal range of motion and neck supple.     Neurological: He is alert and oriented to person, place, and time.   Skin: Skin is warm and dry.   Psychiatric: He has a normal mood and affect. Thought content normal.         ED Course   Procedures  Labs Reviewed - No data to display       Imaging Results    None          Medications   ketorolac injection 30 mg (30 mg Intramuscular Given 1/24/24 1441)   cloNIDine tablet 0.2 mg (0.2 mg Oral Given 1/24/24 1449)     Medical Decision Making  58-year-old male to ED for above complaints.  He had buccal swelling. No drainable dental abscess noted.  No trismus or Nehemias's noted.  Multiple fractured and decaying teeth noted.  Will treat empirically with clindamycin.  He was also given clonidine for his blood pressure.  Dose of Toradol IM given in ED. patient was follow up with dentistry.    Risk  Prescription drug management.                                      Clinical Impression:  Final diagnoses:  [K04.01] Acute pulpitis (Primary)          ED Disposition Condition    Discharge Stable          ED Prescriptions        Medication Sig Dispense Start Date End Date Auth. Provider    clindamycin (CLEOCIN) 150 MG capsule Take 3 capsules (450 mg total) by mouth every 8 (eight) hours. for 7 days 63 capsule 1/24/2024 1/31/2024 Trae Villegas NP          Follow-up Information       Follow up With Specialties Details Why Contact Info    Vic Boles MD Internal Medicine   [address] [facility] [address] [phone]      [facility]  In 1 week  [place]  [address] [facility] [address] [facility]  [place] [address] [phone]    [facility]  Schedule an appointment as soon as possible for a visit   [address] [facility]  [place] [address]  Phone: [phone]    [facility]  Schedule an appointment as soon as possible for a visit   [address]  [place] [address]  Phone: [phone]    [facility], Internal Medicine, Gynecology, Dental General Practice   [address] [place] [address]  [phone]               Trae Villegas NP  01/24/24 6579

## 2024-01-24 NOTE — DISCHARGE INSTRUCTIONS
Take antibiotics 3 times a day for 1 week.  You can take Tylenol and ibuprofen as needed for pain.  Try using Anbesol Gel on your gums for pain relief. Follow up with dentistry for removal of offending teeth.

## 2024-01-25 ENCOUNTER — HOSPITAL ENCOUNTER (EMERGENCY)
Facility: HOSPITAL | Age: 59
Discharge: HOME | End: 2024-01-25
Attending: EMERGENCY MEDICINE
Payer: MEDICARE

## 2024-01-25 DIAGNOSIS — I10 HYPERTENSION, UNSPECIFIED TYPE: ICD-10-CM

## 2024-01-25 DIAGNOSIS — K08.89 DENTALGIA: Primary | ICD-10-CM

## 2024-01-25 PROCEDURE — 99283 EMERGENCY DEPT VISIT LOW MDM: CPT

## 2024-01-25 PROCEDURE — 25000003 PHARM REV CODE 250: Performed by: CLINICAL NURSE SPECIALIST

## 2024-01-25 RX ORDER — IBUPROFEN 800 MG/1
800 TABLET ORAL ONCE
Status: COMPLETED | OUTPATIENT
Start: 2024-01-25 | End: 2024-01-25

## 2024-01-25 RX ORDER — IBUPROFEN 800 MG/1
800 TABLET ORAL EVERY 6 HOURS PRN
Qty: 20 TABLET | Refills: 0 | Status: SHIPPED | OUTPATIENT
Start: 2024-01-25

## 2024-01-25 RX ORDER — CLONIDINE HYDROCHLORIDE 0.2 MG/1
0.2 TABLET ORAL ONCE
Status: COMPLETED | OUTPATIENT
Start: 2024-01-25 | End: 2024-01-25

## 2024-01-25 RX ADMIN — IBUPROFEN 800 MG: 800 TABLET, FILM COATED ORAL at 08:01

## 2024-01-25 RX ADMIN — CLONIDINE HYDROCHLORIDE 0.2 MG: 0.2 TABLET ORAL at 09:01

## 2024-01-26 VITALS
DIASTOLIC BLOOD PRESSURE: 100 MMHG | BODY MASS INDEX: 33.85 KG/M2 | SYSTOLIC BLOOD PRESSURE: 208 MMHG | HEART RATE: 91 BPM | WEIGHT: 255.38 LBS | OXYGEN SATURATION: 100 % | RESPIRATION RATE: 18 BRPM | TEMPERATURE: 98 F | HEIGHT: 73 IN

## 2024-01-26 NOTE — ED PROVIDER NOTES
Encounter Date: 1/25/2024       History     Chief Complaint   Patient presents with    Dental Pain     Pt to the ER w/ complaints of dental pain and swelling to the lower L side, reports multiple bad teeth. Pt seen yesterday for complaints, prescribed clindamycin but not given any medication for pain.      58-year-old male presents to the emergency for prescription of ibuprofen.  Patient states he was seen yesterday and was prescribed ibuprofen and prescription never was prescribed.  Patient currently on antibiotics for his dental caries.  Patient's blood pressure 224/106.  Patient had elevated blood pressure yesterday and was treated with medication.  Patient states that he does take his medications as prescribed        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/2016    Diabetes mellitus     Embolic stroke involving left middle cerebral artery 10/4/2016    Heart attack     Hypertension     Smoker 10/4/2016     Past Surgical History:   Procedure Laterality Date    CARDIAC CATHETERIZATION      COLONOSCOPY N/A 8/16/2023    Procedure: COLONOSCOPY;  Surgeon: Isabelle Shore MD;  Location: [facility];  Service: General;  Laterality: N/A;  1st 0600    CORONARY ANGIOPLASTY WITH STENT PLACEMENT      FEMORAL ARTERY STENT       Family History   Problem Relation Age of Onset    Hypertension Mother     Hypertension Father      Social History     Tobacco Use    Smoking status: Light Smoker    Smokeless tobacco: Never   Substance Use Topics    Alcohol use: No    Drug use: Never     Review of Systems   HENT:  Positive for dental problem.    All other systems reviewed and are negative.      Physical Exam     Initial Vitals [01/25/24 2042]   BP Pulse Resp Temp SpO2   (!) 224/106 89 20 98 °F (36.7 °C) 100 %      MAP       --         Physical Exam    Nursing note and vitals reviewed.  Constitutional: He appears  well-developed and well-nourished.   HENT:   Head: Normocephalic and atraumatic.   Mouth/Throat: Abnormal dentition. Dental caries present.   Eyes: Pupils are equal, round, and reactive to light.   Neck:   Normal range of motion.  Musculoskeletal:         General: Normal range of motion.      Cervical back: Normal range of motion.     Neurological: He is alert and oriented to person, place, and time.   Psychiatric: He has a normal mood and affect.         ED Course   Procedures  Labs Reviewed - No data to display       Imaging Results    None          Medications   cloNIDine tablet 0.2 mg (has no administration in time range)   ibuprofen tablet 800 mg (800 mg Oral Given 1/25/24 2044)     Medical Decision Making  Notified MD of elevated blood pressure readings.  Okay to discharge with current blood pressure after given clonidine    Risk  Prescription drug management.                                      Clinical Impression:  Final diagnoses:  [K08.89] Dentalgia (Primary)  [I10] Hypertension, unspecified type          ED Disposition Condition    Discharge Stable            ED Prescriptions       Medication Sig Dispense Start Date End Date Auth. Provider    ibuprofen (ADVIL,MOTRIN) 800 MG tablet Take 1 tablet (800 mg total) by mouth every 6 (six) hours as needed for Pain. 20 tablet 1/25/2024 -- Madonna Adame, RAMYA          Follow-up Information       Follow up With Specialties Details Why Contact Info    Vic Boles MD Internal Medicine  As needed [address] [facility]  [place] [address] [phone]                 Madonna Adame NP  01/25/24 2041       Madonna Adame NP  01/25/24 2045       Madonna Adame NP  01/25/24 2101       Madonna Adame NP  01/25/24 2105

## (undated) DEVICE — SPONGE DRY VIA GREEN

## (undated) DEVICE — KIT VIA CUSTOM PROCEDURE

## (undated) DEVICE — UNDERPAD DISPOSABLE 30X30IN

## (undated) DEVICE — ELECTRODE MEDI-TRACE 855 FOAM

## (undated) DEVICE — GOWN ECLIPSE REINF LVL4 TWL XL

## (undated) DEVICE — KIT BIOGUARD AIR WTR SUC VALVE

## (undated) DEVICE — CANNULA SSOFT C02 MALE 14FT

## (undated) DEVICE — LINER SUCTION CANNISTER REGUGA

## (undated) DEVICE — SOL IRRI STRL WATER 1000ML

## (undated) DEVICE — CONNECTOR WATERJET ENDO